# Patient Record
Sex: MALE | Race: WHITE | Employment: OTHER | URBAN - METROPOLITAN AREA
[De-identification: names, ages, dates, MRNs, and addresses within clinical notes are randomized per-mention and may not be internally consistent; named-entity substitution may affect disease eponyms.]

---

## 2018-04-10 ENCOUNTER — OFFICE VISIT (OUTPATIENT)
Dept: CARDIOLOGY CLINIC | Age: 83
End: 2018-04-10
Payer: MEDICARE

## 2018-04-10 VITALS
HEART RATE: 58 BPM | WEIGHT: 160.8 LBS | BODY MASS INDEX: 28.49 KG/M2 | HEIGHT: 63 IN | DIASTOLIC BLOOD PRESSURE: 58 MMHG | SYSTOLIC BLOOD PRESSURE: 96 MMHG | RESPIRATION RATE: 16 BRPM

## 2018-04-10 DIAGNOSIS — I48.19 PERSISTENT ATRIAL FIBRILLATION (HCC): Primary | ICD-10-CM

## 2018-04-10 PROCEDURE — 93000 ELECTROCARDIOGRAM COMPLETE: CPT | Performed by: INTERNAL MEDICINE

## 2018-04-10 PROCEDURE — 99214 OFFICE O/P EST MOD 30 MIN: CPT | Performed by: INTERNAL MEDICINE

## 2018-04-10 RX ORDER — AMLODIPINE BESYLATE 10 MG/1
10 TABLET ORAL DAILY
Qty: 90 TABLET | Refills: 3 | Status: SHIPPED
Start: 2018-04-10 | End: 2021-05-04 | Stop reason: DRUGHIGH

## 2018-04-10 RX ORDER — LEVOTHYROXINE SODIUM 0.05 MG/1
50 TABLET ORAL DAILY
COMMUNITY

## 2018-06-15 ENCOUNTER — TELEPHONE (OUTPATIENT)
Dept: CARDIOLOGY CLINIC | Age: 83
End: 2018-06-15

## 2018-07-30 DIAGNOSIS — R00.2 PALPITATION: ICD-10-CM

## 2018-07-30 DIAGNOSIS — I44.7 LBBB (LEFT BUNDLE BRANCH BLOCK): ICD-10-CM

## 2018-07-30 DIAGNOSIS — R06.02 SOB (SHORTNESS OF BREATH): ICD-10-CM

## 2018-10-19 ENCOUNTER — TELEPHONE (OUTPATIENT)
Dept: CARDIOLOGY CLINIC | Age: 83
End: 2018-10-19

## 2018-10-26 ENCOUNTER — TELEPHONE (OUTPATIENT)
Dept: CARDIOLOGY CLINIC | Age: 83
End: 2018-10-26

## 2018-11-08 ENCOUNTER — HOSPITAL ENCOUNTER (OUTPATIENT)
Dept: CT IMAGING | Age: 83
Discharge: HOME | End: 2018-11-10
Payer: MEDICARE

## 2018-11-08 DIAGNOSIS — R05.2 SUBACUTE COUGH: ICD-10-CM

## 2018-11-08 PROCEDURE — 71250 CT THORAX DX C-: CPT

## 2019-01-14 ENCOUNTER — OFFICE VISIT (OUTPATIENT)
Dept: CARDIOLOGY CLINIC | Age: 84
End: 2019-01-14
Payer: MEDICARE

## 2019-01-14 VITALS
HEIGHT: 62 IN | BODY MASS INDEX: 29.59 KG/M2 | HEART RATE: 59 BPM | WEIGHT: 160.8 LBS | DIASTOLIC BLOOD PRESSURE: 70 MMHG | RESPIRATION RATE: 16 BRPM | SYSTOLIC BLOOD PRESSURE: 122 MMHG

## 2019-01-14 DIAGNOSIS — I48.19 PERSISTENT ATRIAL FIBRILLATION (HCC): Primary | ICD-10-CM

## 2019-01-14 PROCEDURE — 93000 ELECTROCARDIOGRAM COMPLETE: CPT | Performed by: INTERNAL MEDICINE

## 2019-01-14 PROCEDURE — 99214 OFFICE O/P EST MOD 30 MIN: CPT | Performed by: INTERNAL MEDICINE

## 2019-01-14 RX ORDER — FINASTERIDE 5 MG/1
5 TABLET, FILM COATED ORAL DAILY
COMMUNITY

## 2019-03-01 ENCOUNTER — TELEPHONE (OUTPATIENT)
Dept: CARDIOLOGY CLINIC | Age: 84
End: 2019-03-01

## 2019-05-24 ENCOUNTER — HOSPITAL ENCOUNTER (OUTPATIENT)
Age: 84
Discharge: HOME | End: 2019-05-26
Payer: MEDICARE

## 2019-05-24 LAB — PROSTATE SPECIFIC ANTIGEN: 0.16 NG/ML (ref 0–4)

## 2019-05-24 PROCEDURE — G0103 PSA SCREENING: HCPCS

## 2019-07-01 ENCOUNTER — TELEPHONE (OUTPATIENT)
Dept: CARDIOLOGY CLINIC | Age: 84
End: 2019-07-01

## 2019-08-13 ENCOUNTER — OFFICE VISIT (OUTPATIENT)
Dept: CARDIOLOGY CLINIC | Age: 84
End: 2019-08-13
Payer: MEDICARE

## 2019-08-13 VITALS
HEIGHT: 63 IN | HEART RATE: 70 BPM | WEIGHT: 158 LBS | DIASTOLIC BLOOD PRESSURE: 60 MMHG | BODY MASS INDEX: 28 KG/M2 | SYSTOLIC BLOOD PRESSURE: 118 MMHG | RESPIRATION RATE: 16 BRPM

## 2019-08-13 DIAGNOSIS — I48.19 PERSISTENT ATRIAL FIBRILLATION (HCC): Primary | ICD-10-CM

## 2019-08-13 PROCEDURE — 99214 OFFICE O/P EST MOD 30 MIN: CPT | Performed by: INTERNAL MEDICINE

## 2019-08-13 PROCEDURE — 93000 ELECTROCARDIOGRAM COMPLETE: CPT | Performed by: INTERNAL MEDICINE

## 2019-08-13 RX ORDER — GLUCOSAMINE HCL 500 MG
3000 TABLET ORAL DAILY
COMMUNITY

## 2019-08-13 RX ORDER — POTASSIUM CHLORIDE 20 MEQ/1
20 TABLET, EXTENDED RELEASE ORAL DAILY
COMMUNITY
Start: 2019-06-12

## 2019-08-13 NOTE — PROGRESS NOTES
Cheyanne             OFFICE VISIT   :  36    CHIEF COMPLAINT: CAD/Afib    HPI: Patient is a 81 y.o. male with a past medical history significant for CAD, status post acute myocardial infarction on 12/3/10 at which time patient went to the lab emergently after transferring from [facility] to [facility], which had a catheterization that showed total occlusion to the mid LAD with high grade stenosis in his proximal LAD. Patient received two bare metal stents. The other remaining vessels appeared to have no significant stenoses and at that juncture, patient appeared to be fully revascularized. Patient also has a history of hypertension and hyperlipidemia. Patient presents in follow up.   he was noted to be in afib with an new LBBB. Stress was stable. Underwent GINETTE-DCCV to sinus. In afib. No CP or SOB. Patient denies any symptoms that suggest heart failure. Past Medical History:   Diagnosis Date    A-fib [facility])     Arthritis     CAD (coronary artery disease)     HTN (hypertension)     Hyperlipemia     LBBB (left bundle branch block)     MI (myocardial infarction) (HCC)        No Known Allergies    Current Outpatient Medications   Medication Sig Dispense Refill    Cholecalciferol (VITAMIN D3) 1000 units CAPS Take 3,000 Units by mouth daily      rivaroxaban (XARELTO) 20 MG TABS tablet Take 1 tablet by mouth daily (with breakfast) 90 tablet 3    finasteride (PROSCAR) 5 MG tablet Take 5 mg by mouth daily      levothyroxine (SYNTHROID) 50 MCG tablet Take 50 mcg by mouth Daily      amLODIPine (NORVASC) 10 MG tablet Take 1 tablet by mouth daily 90 tablet 3    famotidine (PEPCID) 20 MG tablet Take 20 mg by mouth 2 times daily       doxazosin (CARDURA) 8 MG tablet Take 8 mg by mouth nightly.  nitroGLYCERIN (NITROSTAT) 0.4 MG SL tablet Place 0.4 mg under the tongue as needed.         metoprolol (LOPRESSOR) 100 MG tablet Take 100 mg by mouth 2 times daily

## 2019-09-20 ENCOUNTER — TELEPHONE (OUTPATIENT)
Dept: CARDIOLOGY CLINIC | Age: 84
End: 2019-09-20

## 2019-09-20 NOTE — TELEPHONE ENCOUNTER
I called and left a message on his voicemail letting him know we are out of samples but will call when we get some in.

## 2019-09-20 NOTE — TELEPHONE ENCOUNTER
Patient would like samples of  Xarelto 20 mg. Please call him at [phone] if we have any.  Thank you.

## 2020-03-29 NOTE — TELEPHONE ENCOUNTER
I called and spoke to Halie Lomas wife and let her know his samples of Xarelto are available to be picked up.
Patient would like samples of  Xarelto 20 mg. Please call him at [phone] if we have any. Thank you.
S/P cholecystectomy

## 2020-04-17 RX ORDER — RIVAROXABAN 20 MG/1
TABLET, FILM COATED ORAL
Qty: 90 TABLET | Refills: 3 | Status: SHIPPED
Start: 2020-04-17 | End: 2020-10-26 | Stop reason: SDUPTHER

## 2020-05-29 ENCOUNTER — HOSPITAL ENCOUNTER (OUTPATIENT)
Age: 85
Discharge: HOME | End: 2020-05-31
Payer: MEDICARE

## 2020-05-29 LAB — PROSTATE SPECIFIC ANTIGEN: 0.15 NG/ML (ref 0–4)

## 2020-05-29 PROCEDURE — G0103 PSA SCREENING: HCPCS

## 2020-08-03 ENCOUNTER — OFFICE VISIT (OUTPATIENT)
Dept: CARDIOLOGY CLINIC | Age: 85
End: 2020-08-03
Payer: MEDICARE

## 2020-08-03 VITALS
SYSTOLIC BLOOD PRESSURE: 124 MMHG | BODY MASS INDEX: 28 KG/M2 | HEIGHT: 63 IN | WEIGHT: 158 LBS | DIASTOLIC BLOOD PRESSURE: 70 MMHG | HEART RATE: 71 BPM | TEMPERATURE: 96.7 F | RESPIRATION RATE: 16 BRPM

## 2020-08-03 PROCEDURE — 99214 OFFICE O/P EST MOD 30 MIN: CPT | Performed by: INTERNAL MEDICINE

## 2020-08-03 PROCEDURE — 93000 ELECTROCARDIOGRAM COMPLETE: CPT | Performed by: INTERNAL MEDICINE

## 2020-08-03 NOTE — PROGRESS NOTES
Yani Free            OFFICE VISIT   :  36    CHIEF COMPLAINT: CAD/Afib    HPI: Patient is a 81 y.o. male with a past medical history significant for CAD, status post acute myocardial infarction on 12/3/10 at which time patient went to the lab emergently after transferring from [facility] to [facility], which had a catheterization that showed total occlusion to the mid LAD with high grade stenosis in his proximal LAD. Patient received two bare metal stents. The other remaining vessels appeared to have no significant stenoses and at that juncture, patient appeared to be fully revascularized. Patient also has a history of hypertension and hyperlipidemia. In afib. No CP or SOB. Patient denies any symptoms that suggest heart failure. Past Medical History:   Diagnosis Date    A-fib ([address]     Arthritis     CAD (coronary artery disease)     HTN (hypertension)     Hyperlipemia     LBBB (left bundle branch block)     MI (myocardial infarction) (HCC)        No Known Allergies    Current Outpatient Medications   Medication Sig Dispense Refill    XARELTO 20 MG TABS tablet TAKE 1 TABLET DAILY WITH   BREAKFAST 90 tablet 3    potassium chloride (KLOR-CON M) 20 MEQ extended release tablet       Cholecalciferol (VITAMIN D3) 1000 units CAPS Take 3,000 Units by mouth daily      finasteride (PROSCAR) 5 MG tablet Take 5 mg by mouth daily      levothyroxine (SYNTHROID) 50 MCG tablet Take 50 mcg by mouth Daily      amLODIPine (NORVASC) 10 MG tablet Take 1 tablet by mouth daily 90 tablet 3    famotidine (PEPCID) 20 MG tablet Take 20 mg by mouth 2 times daily       doxazosin (CARDURA) 8 MG tablet Take 8 mg by mouth nightly.  nitroGLYCERIN (NITROSTAT) 0.4 MG SL tablet Place 0.4 mg under the tongue as needed.  metoprolol (LOPRESSOR) 100 MG tablet Take 100 mg by mouth 2 times daily       Losartan Potassium-HCTZ (HYZAAR PO) Take  by mouth daily.  100-25 mg       No current facility-administered medications for this visit. Social History     Socioeconomic History    Marital status:      Spouse name: Not on file    Number of children: Not on file    Years of education: Not on file    Highest education level: Not on file   Occupational History    Not on file   Social Needs    Financial resource strain: Not on file    Food insecurity     Worry: Not on file     Inability: Not on file    Transportation needs     Medical: Not on file     Non-medical: Not on file   Tobacco Use    Smoking status: Former Smoker     Packs/day: 0.30     Years: 20.00     Pack years: 6.00     Types: Cigarettes     Last attempt to quit: 1997     Years since quittin.6    Smokeless tobacco: Never Used   Substance and Sexual Activity    Alcohol use: Yes     Comment: occassionally    Drug use: No    Sexual activity: Not on file   Lifestyle    Physical activity     Days per week: Not on file     Minutes per session: Not on file    Stress: Not on file   Relationships    Social connections     Talks on phone: Not on file     Gets together: Not on file     Attends Jainism service: Not on file     Active member of club or organization: Not on file     Attends meetings of clubs or organizations: Not on file     Relationship status: Not on file    Intimate partner violence     Fear of current or ex partner: Not on file     Emotionally abused: Not on file     Physically abused: Not on file     Forced sexual activity: Not on file   Other Topics Concern    Not on file   Social History Narrative    Not on file       Family History   Problem Relation Age of Onset    High Blood Pressure Father     High Blood Pressure Sister     Heart Disease Brother     Cancer Brother     Other Brother         accident       Review of Systems:  Heart: as above   Lungs: as above   Eyes: denies changes in vision or discharge. Ears: denies changes in hearing or pain.    Nose: denies epistaxis or masses   Throat: denies sore throat or trouble swallowing. Neuro: denies numbness, tingling, tremors. Skin: denies rashes or itching. : denies hematuria, dysuria   GI: denies vomiting, diarrhea   Psych: denies mood changed, anxiety, depression. all others negative. Physical Exam   /70   Pulse 71   Temp 96.7 °F (35.9 °C)   Resp 16   Ht 5' 3\" (1.6 m)   Wt 158 lb (71.7 kg)   BMI 27.99 kg/m²   Constitutional: Oriented to person, place, and time. Well-developed and well-nourished. No distress. Head: Normocephalic and atraumatic. Eyes: EOM are normal. Pupils are equal, round, and reactive to light. Neck: Normal range of motion. Neck supple. No hepatojugular reflux and no JVD present. Carotid bruit is not present. No tracheal deviation present. No thyromegaly present. Cardiovascular: Normal rate, irregular rhythm, normal heart sounds and intact distal pulses. Exam reveals no gallop and no friction rub. No murmur heard. Pulmonary/Chest: Effort normal and breath sounds normal. No respiratory distress. No wheezes. No rales. No tenderness. Abdominal: Soft. Bowel sounds are normal. No distension and no mass. No tenderness. No rebound and no guarding. Musculoskeletal: Normal range of motion. No edema and no tenderness. Lymphadenopathy:   No cervical adenopathy. Neurological: Alert and oriented to person, place, and time. Skin: Skin is warm and dry. No rash noted. Not diaphoretic. No erythema. Psychiatric: Normal mood and affect. Behavior is normal.     EKG: Afib. LBBB. ASSESSMENT AND PLAN:  1. CAD:   Stress stable 1/16. Continue current medications and aggressive secondary prevention. 2. Chronic Afib: In afib. Asymptomatic. Continue metoprolol. Xarelto. EP input noted. D/w patient. Patient rate controlled and on Xarelto. Patient states he is asymptomatic. Will pursue rate control and anticoagulation strategy. 3. HTN: Stable.     4. Lipids: Continue Zocor. 5. GERD: Per PCP. Shahab Beard D.O.   Cardiologist  Cardiology, [facility]

## 2021-04-07 ENCOUNTER — TELEPHONE (OUTPATIENT)
Dept: ADMINISTRATIVE | Age: 86
End: 2021-04-07

## 2021-04-07 NOTE — TELEPHONE ENCOUNTER
Pt's wife calling to schedule an OV with [facility] c/o of burping for the past month. She states this is what he did prior to his past heart attack. Next available OV with [facility] is the week of 5/17/21. Advise please. Wife states you can leave a message.

## 2021-04-07 NOTE — TELEPHONE ENCOUNTER
I spoke with patient's wife, she states that patient saw his pcp yesterday and was instructed to f/u with cardiology regarding indigestion and burping, she said patient does not have chest pain or SOB. I scheduled the patient for a f/u next week and informed the wife to take the patient to the ER if his symptoms  get worse, she understood instructions.

## 2021-04-13 ENCOUNTER — OFFICE VISIT (OUTPATIENT)
Dept: CARDIOLOGY CLINIC | Age: 86
End: 2021-04-13
Payer: MEDICARE

## 2021-04-13 VITALS
DIASTOLIC BLOOD PRESSURE: 64 MMHG | RESPIRATION RATE: 16 BRPM | SYSTOLIC BLOOD PRESSURE: 118 MMHG | HEART RATE: 66 BPM | HEIGHT: 61 IN | WEIGHT: 158.6 LBS | BODY MASS INDEX: 29.94 KG/M2

## 2021-04-13 DIAGNOSIS — R07.2 PRECORDIAL PAIN: ICD-10-CM

## 2021-04-13 DIAGNOSIS — I48.19 PERSISTENT ATRIAL FIBRILLATION (HCC): Primary | ICD-10-CM

## 2021-04-13 PROCEDURE — 99214 OFFICE O/P EST MOD 30 MIN: CPT | Performed by: INTERNAL MEDICINE

## 2021-04-13 PROCEDURE — 93000 ELECTROCARDIOGRAM COMPLETE: CPT | Performed by: INTERNAL MEDICINE

## 2021-04-13 RX ORDER — ATORVASTATIN CALCIUM 20 MG/1
20 TABLET, FILM COATED ORAL DAILY
Qty: 90 TABLET | Refills: 3 | Status: SHIPPED
Start: 2021-04-13 | End: 2021-04-14 | Stop reason: SDUPTHER

## 2021-04-13 RX ORDER — ATORVASTATIN CALCIUM 40 MG/1
40 TABLET, FILM COATED ORAL DAILY
Qty: 90 TABLET | Refills: 3 | Status: SHIPPED
Start: 2021-04-13 | End: 2021-04-13

## 2021-04-13 RX ORDER — NITROGLYCERIN 0.4 MG/1
0.4 TABLET SUBLINGUAL PRN
Qty: 25 TABLET | Refills: 1 | Status: SHIPPED | OUTPATIENT
Start: 2021-04-13

## 2021-04-13 NOTE — PROGRESS NOTES
Christiano Monte            OFFICE VISIT   :  36    CHIEF COMPLAINT: CAD/Afib    HPI: Patient is a 81 y.o. male with a past medical history significant for CAD, status post acute myocardial infarction on 12/3/10 at which time patient went to the lab emergently after transferring from [facility] to [facility], which had a catheterization that showed total occlusion to the mid LAD with high grade stenosis in his proximal LAD. Patient received two bare metal stents. The other remaining vessels appeared to have no significant stenoses and at that juncture, patient appeared to be fully revascularized. Patient also has a history of hypertension and hyperlipidemia. In afib. No CP or SOB. Patient denies any symptoms that suggest heart failure. Past Medical History:   Diagnosis Date    A-fib ([address]     Arthritis     CAD (coronary artery disease)     HTN (hypertension)     Hyperlipemia     LBBB (left bundle branch block)     MI (myocardial infarction) (HCC)        No Known Allergies    Current Outpatient Medications   Medication Sig Dispense Refill    rivaroxaban (XARELTO) 20 MG TABS tablet TAKE 1 TABLET DAILY WITH   BREAKFAST 30 tablet 0    potassium chloride (KLOR-CON M) 20 MEQ extended release tablet       Cholecalciferol (VITAMIN D3) 1000 units CAPS Take 3,000 Units by mouth daily      finasteride (PROSCAR) 5 MG tablet Take 5 mg by mouth daily      levothyroxine (SYNTHROID) 50 MCG tablet Take 50 mcg by mouth Daily      amLODIPine (NORVASC) 10 MG tablet Take 1 tablet by mouth daily 90 tablet 3    famotidine (PEPCID) 20 MG tablet Take 20 mg by mouth 2 times daily       doxazosin (CARDURA) 8 MG tablet Take 8 mg by mouth nightly.  nitroGLYCERIN (NITROSTAT) 0.4 MG SL tablet Place 0.4 mg under the tongue as needed.  metoprolol (LOPRESSOR) 100 MG tablet Take 100 mg by mouth 2 times daily       Losartan Potassium-HCTZ (HYZAAR PO) Take  by mouth daily.  100-25 mg       No current facility-administered medications for this visit. Social History     Socioeconomic History    Marital status:      Spouse name: Not on file    Number of children: Not on file    Years of education: Not on file    Highest education level: Not on file   Occupational History    Not on file   Social Needs    Financial resource strain: Not on file    Food insecurity     Worry: Not on file     Inability: Not on file    Transportation needs     Medical: Not on file     Non-medical: Not on file   Tobacco Use    Smoking status: Former Smoker     Packs/day: 0.30     Years: 20.00     Pack years: 6.00     Types: Cigarettes     Quit date: 1997     Years since quittin.2    Smokeless tobacco: Never Used   Substance and Sexual Activity    Alcohol use: Yes     Comment: occassionally    Drug use: No    Sexual activity: Not on file   Lifestyle    Physical activity     Days per week: Not on file     Minutes per session: Not on file    Stress: Not on file   Relationships    Social connections     Talks on phone: Not on file     Gets together: Not on file     Attends Baptism service: Not on file     Active member of club or organization: Not on file     Attends meetings of clubs or organizations: Not on file     Relationship status: Not on file    Intimate partner violence     Fear of current or ex partner: Not on file     Emotionally abused: Not on file     Physically abused: Not on file     Forced sexual activity: Not on file   Other Topics Concern    Not on file   Social History Narrative    Not on file       Family History   Problem Relation Age of Onset    High Blood Pressure Father     High Blood Pressure Sister     Heart Disease Brother     Cancer Brother     Other Brother         accident       Review of Systems:  Heart: as above   Lungs: as above   Eyes: denies changes in vision or discharge. Ears: denies changes in hearing or pain.    Nose: denies epistaxis or masses   Throat: denies sore throat or trouble swallowing. Neuro: denies numbness, tingling, tremors. Skin: denies rashes or itching. : denies hematuria, dysuria   GI: denies vomiting, diarrhea   Psych: denies mood changed, anxiety, depression. all others negative. Physical Exam   There were no vitals taken for this visit. Constitutional: Oriented to person, place, and time. Well-developed and well-nourished. No distress. Head: Normocephalic and atraumatic. Eyes: EOM are normal. Pupils are equal, round, and reactive to light. Neck: Normal range of motion. Neck supple. No hepatojugular reflux and no JVD present. Carotid bruit is not present. No tracheal deviation present. No thyromegaly present. Cardiovascular: Normal rate, irregular rhythm, normal heart sounds and intact distal pulses. Exam reveals no gallop and no friction rub. No murmur heard. Pulmonary/Chest: Effort normal and breath sounds normal. No respiratory distress. No wheezes. No rales. No tenderness. Abdominal: Soft. Bowel sounds are normal. No distension and no mass. No tenderness. No rebound and no guarding. Musculoskeletal: Normal range of motion. No edema and no tenderness. Lymphadenopathy:   No cervical adenopathy. Neurological: Alert and oriented to person, place, and time. Skin: Skin is warm and dry. No rash noted. Not diaphoretic. No erythema. Psychiatric: Normal mood and affect. Behavior is normal.     EKG: Afib. LBBB. ASSESSMENT AND PLAN:  1. CAD/Chest pain:     Stress given new symptoms. Continue statin/BB. No ASA due to Xarelto. 2. Chronic Afib: In afib. Asymptomatic. Continue metoprolol. Xarelto. 3. HTN: Stable. 4. Lipids: Statin. 5. GERD: Per PCP. Frankey Lard, D.O.   Cardiologist  Cardiology, [address] [place] [address]

## 2021-04-14 RX ORDER — ATORVASTATIN CALCIUM 20 MG/1
20 TABLET, FILM COATED ORAL DAILY
Qty: 90 TABLET | Refills: 3 | Status: SHIPPED
Start: 2021-04-14 | End: 2022-06-03

## 2021-04-23 ENCOUNTER — TELEPHONE (OUTPATIENT)
Dept: CARDIOLOGY | Age: 86
End: 2021-04-23

## 2021-04-23 NOTE — TELEPHONE ENCOUNTER
SCHEDULED stress test  FOR 05-04-21 REVIEWED COVID CHECKLIST WITH PATIENT.     Electronically signed by Samia Turner on 4/23/2021 at 1:08 PM

## 2021-04-29 ENCOUNTER — TELEPHONE (OUTPATIENT)
Dept: CARDIOLOGY | Age: 86
End: 2021-04-29

## 2021-05-03 ENCOUNTER — TELEPHONE (OUTPATIENT)
Dept: CARDIOLOGY | Age: 86
End: 2021-05-03

## 2021-05-03 NOTE — TELEPHONE ENCOUNTER
5-3-21 @ 1420. Called and spoke with the patient's wife with a reminder regarding the patient's stress test on 5-4-21 @ 0930. Instructions given including to hold his Metoprolol/Lopressor for 24 hours before his stress test but bring it with him. Reviewed Covid checklist. She voiced understanding to all instructions.   Juan Solis RN

## 2021-05-04 ENCOUNTER — HOSPITAL ENCOUNTER (OUTPATIENT)
Dept: CARDIOLOGY | Age: 86
Discharge: HOME | End: 2021-05-04
Payer: MEDICARE

## 2021-05-04 VITALS
RESPIRATION RATE: 20 BRPM | DIASTOLIC BLOOD PRESSURE: 70 MMHG | WEIGHT: 158 LBS | BODY MASS INDEX: 29.08 KG/M2 | HEIGHT: 62 IN | SYSTOLIC BLOOD PRESSURE: 126 MMHG | HEART RATE: 88 BPM

## 2021-05-04 DIAGNOSIS — R07.2 PRECORDIAL PAIN: ICD-10-CM

## 2021-05-04 PROCEDURE — 3430000000 HC RX DIAGNOSTIC RADIOPHARMACEUTICAL: Performed by: INTERNAL MEDICINE

## 2021-05-04 PROCEDURE — 78452 HT MUSCLE IMAGE SPECT MULT: CPT

## 2021-05-04 PROCEDURE — 6360000002 HC RX W HCPCS: Performed by: INTERNAL MEDICINE

## 2021-05-04 PROCEDURE — 93017 CV STRESS TEST TRACING ONLY: CPT

## 2021-05-04 PROCEDURE — 2580000003 HC RX 258: Performed by: INTERNAL MEDICINE

## 2021-05-04 PROCEDURE — A9500 TC99M SESTAMIBI: HCPCS | Performed by: INTERNAL MEDICINE

## 2021-05-04 RX ORDER — SODIUM CHLORIDE 0.9 % (FLUSH) 0.9 %
10 SYRINGE (ML) INJECTION PRN
Status: DISCONTINUED | OUTPATIENT
Start: 2021-05-04 | End: 2021-05-05 | Stop reason: HOSPADM

## 2021-05-04 RX ORDER — AMLODIPINE BESYLATE 5 MG/1
TABLET ORAL DAILY
COMMUNITY
Start: 2021-02-02

## 2021-05-04 RX ADMIN — SODIUM CHLORIDE, PRESERVATIVE FREE 10 ML: 5 INJECTION INTRAVENOUS at 12:11

## 2021-05-04 RX ADMIN — SODIUM CHLORIDE, PRESERVATIVE FREE 10 ML: 5 INJECTION INTRAVENOUS at 12:10

## 2021-05-04 RX ADMIN — SODIUM CHLORIDE, PRESERVATIVE FREE 10 ML: 5 INJECTION INTRAVENOUS at 09:41

## 2021-05-04 RX ADMIN — REGADENOSON 0.4 MG: 0.08 INJECTION, SOLUTION INTRAVENOUS at 12:10

## 2021-05-04 RX ADMIN — Medication 10.3 MILLICURIE: at 09:40

## 2021-05-04 RX ADMIN — Medication 29 MILLICURIE: at 12:10

## 2021-05-04 NOTE — PROCEDURES
[address] [facility] and Vascular Lab - [address], [address] [facility]  [phone]                Pharmacologic Stress Nuclear Gated SPECT Study    Name: [facility] Account Number: [de-identified]    :  1935          Sex: male         Date of Study:  2021    Height: 5' 2\" (157.5 cm)         Weight: 158 lb (71.7 kg)     Ordering Provider: Baldemar Clinton DO          PCP: Nilesh Dove MD      Cardiologist: Baldemar Clinton DO      Interpreting Physician: Nirmal Tejeda MD  _________________________________________________________________________________    Indication:   Evaluation of extent and severity of coronary artery disease    Clinical History:   Patient has prior history of coronary artery disease. Resting ECG:    Atrial fibrillation 82 bpm. LBBB QRSd 140 ms. Right axis. Procedure:   Pharmacologic stress testing was performed with regadenoson 0.4 mg for 15 seconds. The heart rate was 82 at baseline and dino to 134 beats during the infusion. This corresponds to 99% of maximum predicted heart rate. The blood pressure at baseline was 126/70 and blood pressure at the end of infusion was 130/70. Blood pressure response was normal during the stress procedure. The patient experienced warm feeling during the infusion. ECG during the infusion did not change. IMAGING: Myocardial perfusion imaging was performed at rest 30-35 minutes following the intravenous injection of 10.3 mCi of (Tc-Sestamibi) followed by 10 ml of Normal Saline. As per infusion protocol, the patient was injected intravenously with 29.0 mCi of (Tc-Sestamibi) followed by 10 ml of Normal Saline. Gated post-stress tomographic imaging was performed 20-25 minutes after stress. FINDINGS: The overall quality of the study was good.      Left ventricular cavity size was noted to be normal.    Rotational analog analysis demonstrated soft tissue diaphragmatic

## 2021-05-05 ENCOUNTER — TELEPHONE (OUTPATIENT)
Dept: CARDIOLOGY CLINIC | Age: 86
End: 2021-05-05

## 2021-09-03 ENCOUNTER — OFFICE VISIT (OUTPATIENT)
Dept: CARDIOLOGY CLINIC | Age: 86
End: 2021-09-03
Payer: MEDICARE

## 2021-09-03 VITALS
WEIGHT: 156.5 LBS | DIASTOLIC BLOOD PRESSURE: 78 MMHG | HEIGHT: 62 IN | BODY MASS INDEX: 28.8 KG/M2 | HEART RATE: 72 BPM | SYSTOLIC BLOOD PRESSURE: 128 MMHG | RESPIRATION RATE: 16 BRPM

## 2021-09-03 DIAGNOSIS — I44.7 LBBB (LEFT BUNDLE BRANCH BLOCK): ICD-10-CM

## 2021-09-03 DIAGNOSIS — I48.19 PERSISTENT ATRIAL FIBRILLATION (HCC): Primary | ICD-10-CM

## 2021-09-03 DIAGNOSIS — I25.10 CORONARY ARTERY DISEASE INVOLVING NATIVE CORONARY ARTERY OF NATIVE HEART WITHOUT ANGINA PECTORIS: ICD-10-CM

## 2021-09-03 DIAGNOSIS — I10 ESSENTIAL HYPERTENSION: ICD-10-CM

## 2021-09-03 PROCEDURE — 93000 ELECTROCARDIOGRAM COMPLETE: CPT | Performed by: INTERNAL MEDICINE

## 2021-09-03 PROCEDURE — 99214 OFFICE O/P EST MOD 30 MIN: CPT | Performed by: INTERNAL MEDICINE

## 2021-09-03 RX ORDER — ATORVASTATIN CALCIUM 20 MG/1
20 TABLET, FILM COATED ORAL DAILY
COMMUNITY

## 2021-09-03 NOTE — PROGRESS NOTES
Jasmine Jamesjerome            OFFICE VISIT   :  36    CHIEF COMPLAINT: CAD/Afib    HPI: Patient is a 81 y.o. male with a past medical history significant for CAD, status post acute myocardial infarction on 12/3/10 at which time patient went to the lab emergently after transferring from [facility] to [facility], which had a catheterization that showed total occlusion to the mid LAD with high grade stenosis in his proximal LAD. Patient received two bare metal stents. The other remaining vessels appeared to have no significant stenoses and at that juncture, patient appeared to be fully revascularized. Patient also has a history of hypertension and hyperlipidemia. In afib. No CP or SOB. Patient denies any symptoms that suggest heart failure.        Past Medical History:   Diagnosis Date    A-fib ([facility] [address]     Arthritis     CAD (coronary artery disease)     HTN (hypertension)     Hyperlipemia     LBBB (left bundle branch block)     MI (myocardial infarction) (HCC)        No Known Allergies    Current Outpatient Medications   Medication Sig Dispense Refill    atorvastatin (LIPITOR) 10 MG tablet Take 10 mg by mouth daily      rivaroxaban (XARELTO) 20 MG TABS tablet Take 1 tablet by mouth Daily with supper for 1 day 14 tablet 0    amLODIPine (NORVASC) 5 MG tablet daily      atorvastatin (LIPITOR) 20 MG tablet Take 1 tablet by mouth daily (Patient taking differently: Take 20 mg by mouth daily Indications: taking 1/2 pill daily 10 mg ) 90 tablet 3    nitroGLYCERIN (NITROSTAT) 0.4 MG SL tablet Place 1 tablet under the tongue as needed for Chest pain 25 tablet 1    rivaroxaban (XARELTO) 20 MG TABS tablet TAKE 1 TABLET DAILY WITH   BREAKFAST 30 tablet 0    potassium chloride (KLOR-CON M) 20 MEQ extended release tablet 20 mEq daily       Cholecalciferol (VITAMIN D3) 75 MCG (3000 UT) TABS Take 3,000 Units by mouth daily       finasteride (PROSCAR) 5 MG tablet Take 5 mg by mouth daily  levothyroxine (SYNTHROID) 50 MCG tablet Take 50 mcg by mouth Daily      famotidine (PEPCID) 20 MG tablet Take 20 mg by mouth 2 times daily       doxazosin (CARDURA) 8 MG tablet Take 8 mg by mouth nightly.  metoprolol (LOPRESSOR) 100 MG tablet Take 100 mg by mouth 2 times daily       Losartan Potassium-HCTZ (HYZAAR PO) Take  by mouth daily. 100-25 mg       No current facility-administered medications for this visit. Social History     Socioeconomic History    Marital status:      Spouse name: Not on file    Number of children: Not on file    Years of education: Not on file    Highest education level: Not on file   Occupational History    Not on file   Tobacco Use    Smoking status: Former Smoker     Packs/day: 0.30     Years: 20.00     Pack years: 6.00     Types: Cigarettes     Quit date: 1997     Years since quittin.6    Smokeless tobacco: Never Used   Vaping Use    Vaping Use: Never used   Substance and Sexual Activity    Alcohol use: Yes     Comment: occassionally    Drug use: No    Sexual activity: Not on file   Other Topics Concern    Not on file   Social History Narrative    Not on file     Social Determinants of Health     Financial Resource Strain:     Difficulty of Paying Living Expenses:    Food Insecurity:     Worried About [address] in the Last Year:     [facility] in the Last Year:    Transportation Needs:     Lack of Transportation (Medical):      Lack of Transportation (Non-Medical):    Physical Activity:     Days of Exercise per Week:     Minutes of Exercise per Session:    Stress:     Feeling of Stress :    Social Connections:     Frequency of Communication with Friends and Family:     Frequency of Social Gatherings with Friends and Family:     Attends Yazdanism Services:     Active Member of Clubs or Organizations:     Attends Club or Organization Meetings:     Marital Status:    Intimate Partner Violence:     Fear of Current or Ex-Partner:     Emotionally Abused:     Physically Abused:     Sexually Abused:        Family History   Problem Relation Age of Onset    High Blood Pressure Father     High Blood Pressure Sister     Heart Disease Brother     Cancer Brother     Other Brother         accident       Review of Systems:  Heart: as above   Lungs: as above   Eyes: denies changes in vision or discharge. Ears: denies changes in hearing or pain. Nose: denies epistaxis or masses   Throat: denies sore throat or trouble swallowing. Neuro: denies numbness, tingling, tremors. Skin: denies rashes or itching. : denies hematuria, dysuria   GI: denies vomiting, diarrhea   Psych: denies mood changed, anxiety, depression. all others negative. Physical Exam   /78   Pulse 72   Resp 16   Ht 5' 2\" (1.575 m)   Wt 156 lb 8 oz (71 kg)   BMI 28.62 kg/m²   Constitutional: Oriented to person, place, and time. Well-developed and well-nourished. No distress. Head: Normocephalic and atraumatic. Eyes: EOM are normal. Pupils are equal, round, and reactive to light. Neck: Normal range of motion. Neck supple. No hepatojugular reflux and no JVD present. Carotid bruit is not present. No tracheal deviation present. No thyromegaly present. Cardiovascular: Normal rate, irregular rhythm, normal heart sounds and intact distal pulses. Exam reveals no gallop and no friction rub. No murmur heard. Pulmonary/Chest: Effort normal and breath sounds normal. No respiratory distress. No wheezes. No rales. No tenderness. Abdominal: Soft. Bowel sounds are normal. No distension and no mass. No tenderness. No rebound and no guarding. Musculoskeletal: Normal range of motion. No edema and no tenderness. Lymphadenopathy:   No cervical adenopathy. Neurological: Alert and oriented to person, place, and time. Skin: Skin is warm and dry. No rash noted. Not diaphoretic. No erythema. Psychiatric: Normal mood and affect.  Behavior is normal.     EKG: Afib. LBBB. Stress Impression 5/4/2021:     1. ECG during the infusion did not change, with baseline atrial fibrillation. 2. The myocardial perfusion imaging was abnormal.     The abnormality was a a large sized fixed defect in the septal and apical wall suggestive of a prior MI.   3. Overall left ventricular systolic function was normal with septal and apical hypokinesis. 4. Low risk post MI pharmacologic stress test.     ASSESSMENT AND PLAN:  1. CAD/Chest pain:     Stress low risk 5/4/2021. Continue statin/BB. No ASA due to Xarelto. 2. Chronic Afib: In afib. Asymptomatic. Continue metoprolol. Xarelto. 3. HTN: Stable. 4. Lipids: Statin. 5. GERD: Per PCP. Funmi Jefferson D.O.   Cardiologist  Cardiology, [facility]

## 2022-01-26 RX ORDER — RIVAROXABAN 20 MG/1
TABLET, FILM COATED ORAL
Qty: 90 TABLET | Refills: 3 | Status: SHIPPED | OUTPATIENT
Start: 2022-01-26

## 2022-04-26 RX ORDER — ATORVASTATIN CALCIUM 40 MG/1
TABLET, FILM COATED ORAL
Qty: 90 TABLET | Refills: 3 | OUTPATIENT
Start: 2022-04-26

## 2022-06-03 ENCOUNTER — OFFICE VISIT (OUTPATIENT)
Dept: CARDIOLOGY CLINIC | Age: 87
End: 2022-06-03
Payer: MEDICARE

## 2022-06-03 VITALS
RESPIRATION RATE: 12 BRPM | BODY MASS INDEX: 27.82 KG/M2 | WEIGHT: 157 LBS | SYSTOLIC BLOOD PRESSURE: 112 MMHG | HEIGHT: 63 IN | DIASTOLIC BLOOD PRESSURE: 70 MMHG | HEART RATE: 74 BPM

## 2022-06-03 DIAGNOSIS — I48.19 PERSISTENT ATRIAL FIBRILLATION (HCC): Primary | ICD-10-CM

## 2022-06-03 PROCEDURE — 99214 OFFICE O/P EST MOD 30 MIN: CPT | Performed by: INTERNAL MEDICINE

## 2022-06-03 PROCEDURE — 93000 ELECTROCARDIOGRAM COMPLETE: CPT | Performed by: INTERNAL MEDICINE

## 2022-06-03 PROCEDURE — 1123F ACP DISCUSS/DSCN MKR DOCD: CPT | Performed by: INTERNAL MEDICINE

## 2022-06-03 RX ORDER — METOPROLOL SUCCINATE 100 MG/1
100 TABLET, EXTENDED RELEASE ORAL DAILY
COMMUNITY
Start: 2022-04-27 | End: 2022-06-03

## 2022-06-03 RX ORDER — ATORVASTATIN CALCIUM 40 MG/1
TABLET, FILM COATED ORAL
COMMUNITY
Start: 2022-03-12 | End: 2022-06-03

## 2022-06-03 NOTE — PROGRESS NOTES
Cheyanne Jarrell            OFFICE VISIT   :  36    CHIEF COMPLAINT: CAD/Afib    HPI: Patient is a 81 y.o. male with a past medical history significant for CAD, status post acute myocardial infarction on 12/3/10 at which time patient went to the lab emergently after transferring from [facility] to [facility], which had a catheterization that showed total occlusion to the mid LAD with high grade stenosis in his proximal LAD. Patient received two bare metal stents. The other remaining vessels appeared to have no significant stenoses and at that juncture, patient appeared to be fully revascularized. Patient also has a history of hypertension and hyperlipidemia. In afib. No CP or SOB. Patient denies any symptoms that suggest heart failure. Past Medical History:   Diagnosis Date    [facility] ([facility] [address]     Arthritis     CAD (coronary artery disease)     HTN (hypertension)     Hyperlipemia     LBBB (left bundle branch block)     MI (myocardial infarction) (HCC)        No Known Allergies    Current Outpatient Medications   Medication Sig Dispense Refill    XARELTO 20 MG TABS tablet TAKE 1 TABLET DAILY WITH   BREAKFAST 90 tablet 3    atorvastatin (LIPITOR) 20 MG tablet Take 20 mg by mouth daily       amLODIPine (NORVASC) 5 MG tablet daily      nitroGLYCERIN (NITROSTAT) 0.4 MG SL tablet Place 1 tablet under the tongue as needed for Chest pain 25 tablet 1    potassium chloride (KLOR-CON M) 20 MEQ extended release tablet 20 mEq daily       Cholecalciferol (VITAMIN D3) 75 MCG (3000 UT) TABS Take 3,000 Units by mouth daily       finasteride (PROSCAR) 5 MG tablet Take 5 mg by mouth daily      levothyroxine (SYNTHROID) 50 MCG tablet Take 50 mcg by mouth Daily      famotidine (PEPCID) 20 MG tablet Take 20 mg by mouth 2 times daily       doxazosin (CARDURA) 8 MG tablet Take 8 mg by mouth nightly.  Losartan Potassium-HCTZ (HYZAAR PO) Take  by mouth daily.  100-25 mg       No Number of Places Lived in the Last Year: Not on file    Unstable Housing in the Last Year: Not on file       Family History   Problem Relation Age of Onset    High Blood Pressure Father     High Blood Pressure Sister     Heart Disease Brother     Cancer Brother     Other Brother         accident       Review of Systems:  Heart: as above   Lungs: as above   Eyes: denies changes in vision or discharge. Ears: denies changes in hearing or pain. Nose: denies epistaxis or masses   Throat: denies sore throat or trouble swallowing. Neuro: denies numbness, tingling, tremors. Skin: denies rashes or itching. : denies hematuria, dysuria   GI: denies vomiting, diarrhea   Psych: denies mood changed, anxiety, depression. all others negative. Physical Exam   /70   Pulse 74   Resp 12   Ht 5' 3\" (1.6 m)   Wt 157 lb (71.2 kg)   BMI 27.81 kg/m²   Constitutional: Oriented to person, place, and time. Well-developed and well-nourished. No distress. Head: Normocephalic and atraumatic. Eyes: EOM are normal. Pupils are equal, round, and reactive to light. Neck: Normal range of motion. Neck supple. No hepatojugular reflux and no JVD present. Carotid bruit is not present. No tracheal deviation present. No thyromegaly present. Cardiovascular: Normal rate, irregular rhythm, normal heart sounds and intact distal pulses. Exam reveals no gallop and no friction rub. No murmur heard. Pulmonary/Chest: Effort normal and breath sounds normal. No respiratory distress. No wheezes. No rales. No tenderness. Abdominal: Soft. Bowel sounds are normal. No distension and no mass. No tenderness. No rebound and no guarding. Musculoskeletal: Normal range of motion. No edema and no tenderness. Lymphadenopathy:   No cervical adenopathy. Neurological: Alert and oriented to person, place, and time. Skin: Skin is warm and dry. No rash noted. Not diaphoretic. No erythema. Psychiatric: Normal mood and affect. Behavior is normal.     EKG: Afib. LBBB. Stress Impression 5/4/2021:     1. ECG during the infusion did not change, with baseline atrial fibrillation. 2. The myocardial perfusion imaging was abnormal.     The abnormality was a a large sized fixed defect in the septal and apical wall suggestive of a prior MI.   3. Overall left ventricular systolic function was normal with septal and apical hypokinesis. 4. Low risk post MI pharmacologic stress test.     ASSESSMENT AND PLAN:  1. CAD/Chest pain:     Stress low risk 5/4/2021. Continue statin/BB. No ASA due to Xarelto. 2. Chronic Afib: In afib. Asymptomatic. Continue metoprolol. Xarelto. 3. HTN: Stable. 4. Lipids: Statin. 5. GERD: Per PCP. Donna Hendricks D.O.   Cardiologist  Cardiology, [facility]

## 2022-10-10 ENCOUNTER — HOSPITAL ENCOUNTER (OUTPATIENT)
Dept: INFUSION THERAPY | Age: 87
Setting detail: INFUSION SERIES
Discharge: HOME | End: 2022-10-10
Payer: MEDICARE

## 2022-10-10 VITALS
RESPIRATION RATE: 16 BRPM | HEART RATE: 92 BPM | OXYGEN SATURATION: 98 % | SYSTOLIC BLOOD PRESSURE: 135 MMHG | TEMPERATURE: 98.6 F | DIASTOLIC BLOOD PRESSURE: 67 MMHG

## 2022-10-10 PROCEDURE — 6360000002 HC RX W HCPCS: Performed by: NURSE PRACTITIONER

## 2022-10-10 PROCEDURE — M0222 HC BEBTELOVIMAB INJECTION: HCPCS

## 2022-10-10 PROCEDURE — 2580000003 HC RX 258: Performed by: NURSE PRACTITIONER

## 2022-10-10 RX ORDER — SODIUM CHLORIDE 0.9 % (FLUSH) 0.9 %
5-40 SYRINGE (ML) INJECTION PRN
Status: DISCONTINUED | OUTPATIENT
Start: 2022-10-10 | End: 2022-10-11 | Stop reason: HOSPADM

## 2022-10-10 RX ORDER — SODIUM CHLORIDE 9 MG/ML
100 INJECTION, SOLUTION INTRAVENOUS CONTINUOUS PRN
Status: DISCONTINUED | OUTPATIENT
Start: 2022-10-10 | End: 2022-10-11 | Stop reason: HOSPADM

## 2022-10-10 RX ORDER — BEBTELOVIMAB 87.5 MG/ML
175 INJECTION, SOLUTION INTRAVENOUS ONCE
Status: COMPLETED | OUTPATIENT
Start: 2022-10-10 | End: 2022-10-10

## 2022-10-10 RX ORDER — EPINEPHRINE 1 MG/ML
0.3 INJECTION, SOLUTION, CONCENTRATE INTRAVENOUS PRN
Status: DISCONTINUED | OUTPATIENT
Start: 2022-10-10 | End: 2022-10-11 | Stop reason: HOSPADM

## 2022-10-10 RX ORDER — SODIUM CHLORIDE 9 MG/ML
25 INJECTION, SOLUTION INTRAVENOUS PRN
Status: DISCONTINUED | OUTPATIENT
Start: 2022-10-10 | End: 2022-10-11 | Stop reason: HOSPADM

## 2022-10-10 RX ORDER — METHYLPREDNISOLONE SODIUM SUCCINATE 125 MG/2ML
125 INJECTION, POWDER, LYOPHILIZED, FOR SOLUTION INTRAMUSCULAR; INTRAVENOUS
Status: DISCONTINUED | OUTPATIENT
Start: 2022-10-10 | End: 2022-10-11 | Stop reason: HOSPADM

## 2022-10-10 RX ORDER — ACETAMINOPHEN 325 MG/1
650 TABLET ORAL
Status: DISCONTINUED | OUTPATIENT
Start: 2022-10-10 | End: 2022-10-11 | Stop reason: HOSPADM

## 2022-10-10 RX ORDER — ONDANSETRON 2 MG/ML
8 INJECTION INTRAMUSCULAR; INTRAVENOUS
Status: DISCONTINUED | OUTPATIENT
Start: 2022-10-10 | End: 2022-10-11 | Stop reason: HOSPADM

## 2022-10-10 RX ORDER — DIPHENHYDRAMINE HYDROCHLORIDE 50 MG/ML
50 INJECTION INTRAMUSCULAR; INTRAVENOUS
Status: DISCONTINUED | OUTPATIENT
Start: 2022-10-10 | End: 2022-10-11 | Stop reason: HOSPADM

## 2022-10-10 RX ORDER — ALBUTEROL SULFATE 90 UG/1
4 AEROSOL, METERED RESPIRATORY (INHALATION) PRN
Status: DISCONTINUED | OUTPATIENT
Start: 2022-10-10 | End: 2022-10-11 | Stop reason: HOSPADM

## 2022-10-10 RX ADMIN — SODIUM CHLORIDE, PRESERVATIVE FREE 10 ML: 5 INJECTION INTRAVENOUS at 10:09

## 2022-10-10 RX ADMIN — BEBTELOVIMAB 175 MG: 87.5 INJECTION, SOLUTION INTRAVENOUS at 10:15

## 2022-10-10 RX ADMIN — SODIUM CHLORIDE, PRESERVATIVE FREE 10 ML: 5 INJECTION INTRAVENOUS at 10:19

## 2022-12-02 ENCOUNTER — OFFICE VISIT (OUTPATIENT)
Dept: CARDIOLOGY CLINIC | Age: 87
End: 2022-12-02
Payer: MEDICARE

## 2022-12-02 VITALS
WEIGHT: 155.6 LBS | HEART RATE: 61 BPM | SYSTOLIC BLOOD PRESSURE: 140 MMHG | DIASTOLIC BLOOD PRESSURE: 82 MMHG | BODY MASS INDEX: 27.57 KG/M2 | HEIGHT: 63 IN

## 2022-12-02 DIAGNOSIS — I48.19 PERSISTENT ATRIAL FIBRILLATION (HCC): Primary | ICD-10-CM

## 2022-12-02 PROCEDURE — 1123F ACP DISCUSS/DSCN MKR DOCD: CPT | Performed by: INTERNAL MEDICINE

## 2022-12-02 PROCEDURE — 99214 OFFICE O/P EST MOD 30 MIN: CPT | Performed by: INTERNAL MEDICINE

## 2022-12-02 PROCEDURE — 93000 ELECTROCARDIOGRAM COMPLETE: CPT | Performed by: INTERNAL MEDICINE

## 2022-12-02 NOTE — PROGRESS NOTES
Mirna Tao            OFFICE VISIT   :  36    CHIEF COMPLAINT: CAD/Afib    HPI: Patient is a 81 y.o. male with a past medical history significant for CAD, status post acute myocardial infarction on 12/3/10 at which time patient went to the lab emergently after transferring from [facility] to [facility], which had a catheterization that showed total occlusion to the mid LAD with high grade stenosis in his proximal LAD. Patient received two bare metal stents. The other remaining vessels appeared to have no significant stenoses and at that juncture, patient appeared to be fully revascularized. Patient also has a history of hypertension and hyperlipidemia. In afib. No CP or SOB. Patient denies any symptoms that suggest heart failure. Past Medical History:   Diagnosis Date    A-fib [facility])     Arthritis     CAD (coronary artery disease)     HTN (hypertension)     Hyperlipemia     LBBB (left bundle branch block)     MI (myocardial infarction) (HCC)        No Known Allergies    Current Outpatient Medications   Medication Sig Dispense Refill    XARELTO 20 MG TABS tablet TAKE 1 TABLET DAILY WITH   BREAKFAST 90 tablet 3    atorvastatin (LIPITOR) 20 MG tablet Take 20 mg by mouth daily       amLODIPine (NORVASC) 5 MG tablet daily      nitroGLYCERIN (NITROSTAT) 0.4 MG SL tablet Place 1 tablet under the tongue as needed for Chest pain 25 tablet 1    potassium chloride (KLOR-CON M) 20 MEQ extended release tablet 20 mEq daily       Cholecalciferol (VITAMIN D3) 75 MCG (3000 UT) TABS Take 5,000 Units by mouth daily      levothyroxine (SYNTHROID) 50 MCG tablet Take 50 mcg by mouth Daily      famotidine (PEPCID) 20 MG tablet Take 20 mg by mouth 2 times daily       doxazosin (CARDURA) 8 MG tablet Take 8 mg by mouth nightly. Losartan Potassium-HCTZ (HYZAAR PO) Take  by mouth daily.  100-25 mg      finasteride (PROSCAR) 5 MG tablet Take 5 mg by mouth daily       No current facility-administered medications for this visit. Social History     Socioeconomic History    Marital status:      Spouse name: Not on file    Number of children: Not on file    Years of education: Not on file    Highest education level: Not on file   Occupational History    Not on file   Tobacco Use    Smoking status: Former     Packs/day: 0.30     Years: 20.00     Pack years: 6.00     Types: Cigarettes     Quit date: 1997     Years since quittin.9    Smokeless tobacco: Never   Vaping Use    Vaping Use: Never used   Substance and Sexual Activity    Alcohol use: Yes     Comment: occassionally    Drug use: No    Sexual activity: Not on file   Other Topics Concern    Not on file   Social History Narrative    Not on file     Social Determinants of Health     Financial Resource Strain: Not on file   Food Insecurity: Not on file   Transportation Needs: Not on file   Physical Activity: Not on file   Stress: Not on file   Social Connections: Not on file   Intimate Partner Violence: Not on file   Housing Stability: Not on file       Family History   Problem Relation Age of Onset    High Blood Pressure Father     High Blood Pressure Sister     Heart Disease Brother     Cancer Brother     Other Brother         accident       Review of Systems:  Heart: as above   Lungs: as above   Eyes: denies changes in vision or discharge. Ears: denies changes in hearing or pain. Nose: denies epistaxis or masses   Throat: denies sore throat or trouble swallowing. Neuro: denies numbness, tingling, tremors. Skin: denies rashes or itching. : denies hematuria, dysuria   GI: denies vomiting, diarrhea   Psych: denies mood changed, anxiety, depression. all others negative. Physical Exam   BP (!) 140/82   Pulse 61   Ht 5' 3\" (1.6 m)   Wt 155 lb 9.6 oz (70.6 kg)   BMI 27.56 kg/m²   Constitutional: Oriented to person, place, and time. Well-developed and well-nourished. No distress.     Head: Normocephalic and atraumatic. Eyes: EOM are normal. Pupils are equal, round, and reactive to light. Neck: Normal range of motion. Neck supple. No hepatojugular reflux and no JVD present. Carotid bruit is not present. No tracheal deviation present. No thyromegaly present. Cardiovascular: Normal rate, irregular rhythm, normal heart sounds and intact distal pulses. Exam reveals no gallop and no friction rub. No murmur heard. Pulmonary/Chest: Effort normal and breath sounds normal. No respiratory distress. No wheezes. No rales. No tenderness. Abdominal: Soft. Bowel sounds are normal. No distension and no mass. No tenderness. No rebound and no guarding. Musculoskeletal: Normal range of motion. No edema and no tenderness. Lymphadenopathy:   No cervical adenopathy. Neurological: Alert and oriented to person, place, and time. Skin: Skin is warm and dry. No rash noted. Not diaphoretic. No erythema. Psychiatric: Normal mood and affect. Behavior is normal.     EKG: Afib. LBBB. Stress Impression 5/4/2021:     1. ECG during the infusion did not change, with baseline atrial fibrillation. 2. The myocardial perfusion imaging was abnormal.     The abnormality was a a large sized fixed defect in the septal and apical wall suggestive of a prior MI.   3. Overall left ventricular systolic function was normal with septal and apical hypokinesis. 4. Low risk post MI pharmacologic stress test.     ASSESSMENT AND PLAN:  1. CAD/Chest pain:     Stress low risk 5/4/2021. Continue statin/BB. No ASA due to Xarelto. 2. Chronic Afib: In afib. Asymptomatic. Continue metoprolol. Xarelto. 3. HTN: Stable. 4. Lipids: Statin. 5. GERD: Per PCP. Mauro Garcia D.O.   Cardiologist  Cardiology, [facility]

## 2022-12-22 ENCOUNTER — TELEPHONE (OUTPATIENT)
Dept: CARDIOLOGY CLINIC | Age: 87
End: 2022-12-22

## 2022-12-22 NOTE — TELEPHONE ENCOUNTER
Xarelto 20mg samples were given to patient from [facility]  Exp date 10/24  Patient was given 2bottles

## 2023-12-27 ENCOUNTER — TELEPHONE (OUTPATIENT)
Dept: CARDIOLOGY CLINIC | Age: 88
End: 2023-12-27

## 2023-12-27 NOTE — TELEPHONE ENCOUNTER
Patient has been holding Norvasc since 12/18 as instructed, wife states patient is feeling better and does not have any edema in his feet, occasionally he gets headaches and light headedness, this is his bp for the past five days:    138/60  139/66  141/72  126/63  133/62

## 2023-12-27 NOTE — TELEPHONE ENCOUNTER
BP numbers reasonable. Continue same, let us know if symptoms do not improve in 1-2 weeks. Pauline Arthur D.O.   Cardiologist  Cardiology, [facility]

## 2024-01-09 ENCOUNTER — TELEPHONE (OUTPATIENT)
Dept: CARDIOLOGY CLINIC | Age: 89
End: 2024-01-09

## 2024-01-09 RX ORDER — NIFEDIPINE 60 MG/1
60 TABLET, EXTENDED RELEASE ORAL DAILY
Qty: 90 TABLET | Refills: 1 | Status: SHIPPED | OUTPATIENT
Start: 2024-01-09

## 2024-01-09 NOTE — TELEPHONE ENCOUNTER
Wife states that bp has been going up gradually, patient has been holding Norvasc since 12/18:    223/97  162/69  152/70  140/67  Wife was worried today so she gave patient an extra toprol, please advise

## 2024-01-12 ENCOUNTER — TELEPHONE (OUTPATIENT)
Dept: CARDIOLOGY CLINIC | Age: 89
End: 2024-01-12

## 2024-01-12 NOTE — TELEPHONE ENCOUNTER
Patient started Procardia 60mg daily on 1/9 as instructed, wife called today stating bp is much improved however patient is experiencing a lot of side effects, dizziness, headaches, body itch, constipated and unable to urinate, please advise

## 2024-01-12 NOTE — TELEPHONE ENCOUNTER
Try cutting it down to 30 mg daily.     Adela Ybarra D.O.  Cardiologist  Cardiology, [facility]

## 2024-01-15 ENCOUNTER — TELEPHONE (OUTPATIENT)
Dept: CARDIOLOGY CLINIC | Age: 89
End: 2024-01-15

## 2024-01-15 NOTE — TELEPHONE ENCOUNTER
Patient's wife called with an update, patient was instructed to cut procardia to 30mg daily, wife states patient is feeling much better, dizziness has improved and he is not constipated, his bp over the weekend:    Sat 130/67  Sun 146/73  Mon 136/76  Please advise

## 2024-02-15 ENCOUNTER — NURSE ONLY (OUTPATIENT)
Dept: CARDIOLOGY CLINIC | Age: 89
End: 2024-02-15

## 2024-02-15 NOTE — PROGRESS NOTES
Patient presented today and was given  Xarelto 20mg. The medication is being monitored by Dr. Ybarra.   Sample drug name: Xarelto 20mg  Sample drug lot #: [id number]  Sample drug expiration date: 10/25  How many sample boxes and/or bottles given: 4 bottles

## 2024-05-01 ENCOUNTER — TELEPHONE (OUTPATIENT)
Dept: ADMINISTRATIVE | Age: 89
End: 2024-05-01

## 2024-05-01 NOTE — TELEPHONE ENCOUNTER
Rupa called to schedule pt's appt w/Dr Ybarra, first available 10/4 - he was due around 6/18/24; is this okay to wait?   Please advise

## 2024-06-24 ENCOUNTER — TELEPHONE (OUTPATIENT)
Dept: CARDIOLOGY CLINIC | Age: 89
End: 2024-06-24

## 2024-06-24 NOTE — TELEPHONE ENCOUNTER
Patient is scheduled for an excision of basal cell carcinoma on the right temple and needs instructions regarding holding Xarelto, please advise

## 2024-06-25 NOTE — TELEPHONE ENCOUNTER
Okay to proceed. Okay to hold Xarelto for 3 days prior.     Adela Ybarra D.O.  Cardiologist  Cardiology, [facility]

## 2024-07-25 ENCOUNTER — OFFICE VISIT (OUTPATIENT)
Dept: SURGERY | Age: 89
End: 2024-07-25
Payer: COMMERCIAL

## 2024-07-25 ENCOUNTER — TELEPHONE (OUTPATIENT)
Dept: SURGERY | Age: 89
End: 2024-07-25

## 2024-07-25 ENCOUNTER — TELEPHONE (OUTPATIENT)
Dept: CARDIOLOGY CLINIC | Age: 89
End: 2024-07-25

## 2024-07-25 VITALS
SYSTOLIC BLOOD PRESSURE: 117 MMHG | OXYGEN SATURATION: 100 % | DIASTOLIC BLOOD PRESSURE: 63 MMHG | BODY MASS INDEX: 28 KG/M2 | TEMPERATURE: 98.1 F | WEIGHT: 158 LBS | HEART RATE: 91 BPM | HEIGHT: 63 IN

## 2024-07-25 DIAGNOSIS — C44.211 BASAL CELL CARCINOMA (BCC) OF SKIN OF EAR, UNSPECIFIED LATERALITY: Primary | ICD-10-CM

## 2024-07-25 PROCEDURE — 99204 OFFICE O/P NEW MOD 45 MIN: CPT | Performed by: PLASTIC SURGERY

## 2024-07-25 PROCEDURE — 1123F ACP DISCUSS/DSCN MKR DOCD: CPT | Performed by: PLASTIC SURGERY

## 2024-07-25 RX ORDER — MULTIVIT WITH MINERALS/LUTEIN
5000 TABLET ORAL DAILY
COMMUNITY

## 2024-07-25 NOTE — TELEPHONE ENCOUNTER
Patient is going to call once scheduled with PCP and cardiology to schedule surgery at [facility]

## 2024-07-25 NOTE — PROGRESS NOTES
Department of Plastic Surgery - Adult  Attending Consult Note      CHIEF COMPLAINT:   Basal Cell Cancer of Left Posterior Ear    History Obtained From:  patient, spouse    HISTORY OF PRESENT ILLNESS:                The patient is a 88 y.o. male who presents with suspected basal cell cancer of left posterior ear.  The patient states that they first noticed the lesion 4 months ago.  It has grown significantly in size since they first noticed the lesion.  The lesion has not changed in color and has not had discharge or bleeding.  The pt has not had the lesion biopsied previously.  The patient has not had the lesion removed previously. The patient states the lesion is  painful to palpation.  The pt denies any associated symptoms.      Past Medical History:    Past Medical History:   Diagnosis Date    A-fib (HCC)     Arthritis     CAD (coronary artery disease)     HTN (hypertension)     Hyperlipemia     LBBB (left bundle branch block)     MI (myocardial infarction) (HCC)      Past Surgical History:    Past Surgical History:   Procedure Laterality Date    EYE SURGERY Bilateral     cataract removal w lense implants    KNEE SURGERY Right      Current Medications:   No current facility-administered medications for this visit.  Allergies:  Patient has no known allergies.    Social History:   Social History     Socioeconomic History    Marital status:      Spouse name: Not on file    Number of children: Not on file    Years of education: Not on file    Highest education level: Not on file   Occupational History    Not on file   Tobacco Use    Smoking status: Former     Current packs/day: 0.00     Average packs/day: 0.3 packs/day for 20.0 years (6.0 ttl pk-yrs)     Types: Cigarettes     Start date: 1977     Quit date: 1997     Years since quittin.5    Smokeless tobacco: Never   Vaping Use    Vaping Use: Never used   Substance and Sexual Activity    Alcohol use: Yes     Comment: occassionally    Drug use: No

## 2024-07-26 ENCOUNTER — TELEPHONE (OUTPATIENT)
Dept: CARDIOLOGY CLINIC | Age: 89
End: 2024-07-26

## 2024-07-26 NOTE — TELEPHONE ENCOUNTER
Okay to proceed. Okay to hold Xarelto as requested.    Adela Ybarra D.O.  Cardiologist  Cardiology, [facility]

## 2024-07-26 NOTE — TELEPHONE ENCOUNTER
On 6/24 Patient was cleared for an excision of basal cell carcinoma on the right temple with , surgery has been cancelled and will be rescheduled with  in August, patient will hold Xarelto 3 days prior, does clearance still stand?

## 2024-08-19 ENCOUNTER — PREP FOR PROCEDURE (OUTPATIENT)
Dept: SURGERY | Age: 89
End: 2024-08-19

## 2024-08-19 PROBLEM — C44.91 BCC (BASAL CELL CARCINOMA): Status: ACTIVE | Noted: 2024-08-19

## 2024-08-19 NOTE — H&P
Department of Plastic Surgery - Adult  Attending Consult Note        CHIEF COMPLAINT:   Basal Cell Cancer of Left Posterior Ear     History Obtained From:  patient, spouse     HISTORY OF PRESENT ILLNESS:                 The patient is a 88 y.o. male who presents with suspected basal cell cancer of left posterior ear.  The patient states that they first noticed the lesion 4 months ago.  It has grown significantly in size since they first noticed the lesion.  The lesion has not changed in color and has not had discharge or bleeding.  The pt has not had the lesion biopsied previously.  The patient has not had the lesion removed previously. The patient states the lesion is  painful to palpation.  The pt denies any associated symptoms.       Past Medical History:    Past Medical History        Past Medical History:   Diagnosis Date    A-fib (HCC)      Arthritis      CAD (coronary artery disease)      HTN (hypertension)      Hyperlipemia      LBBB (left bundle branch block)      MI (myocardial infarction) (HCC)           Past Surgical History:    Past Surgical History         Past Surgical History:   Procedure Laterality Date    EYE SURGERY Bilateral       cataract removal w lense implants    KNEE SURGERY Right           Current Medications:   Current Hospital Medications   No current facility-administered medications for this visit.     Allergies:  Patient has no known allergies.     Social History:   Social History               Socioeconomic History    Marital status:        Spouse name: Not on file    Number of children: Not on file    Years of education: Not on file    Highest education level: Not on file   Occupational History    Not on file   Tobacco Use    Smoking status: Former       Current packs/day: 0.00       Average packs/day: 0.3 packs/day for 20.0 years (6.0 ttl pk-yrs)       Types: Cigarettes       Start date: 1977       Quit date: 1997       Years since quittin.5    Smokeless tobacco:  possible local tissue rearrangement, possible full thickness skin graft     -The risks, benefits and options were discussed with the pt. The risks included but not limited to pain, bleeding, infection, heavy scarring, damage to surrounding structures, fluid collections, asymmetry, and need for further procedures. All of His questions were answered to their satisfaction and He agrees to proceed with the procedure.    Attestation    No change in patient's H & P. I have reviewed the procedure with the patient as well as the risk and benefits. They have no further questions and agree to proceed with surgery.    Jhoan Craft MD   8:13 AM  8/29/2024

## 2024-08-26 RX ORDER — M-VIT,TX,IRON,MINS/CALC/FOLIC 27MG-0.4MG
1 TABLET ORAL DAILY
COMMUNITY

## 2024-08-26 NOTE — PROGRESS NOTES
[facility]   PRE-ADMISSION TESTING GENERAL INSTRUCTIONS  PAT Phone Number: [phone]      GENERAL INSTRUCTIONS:    [x] Antibacterial Soap Shower Night before AND the Morning of procedure.    [x] Do not wear makeup, lotions, powders, deodorant the morning of surgery.  [x] No solid food after midnight. You may have SIPS of clear liquids up until 2 hours before your arrival time to the hospital.   [x] You may brush your teeth, gargle, but do not swallow water.   [x] No tobacco products, illegal drugs, or alcohol within 24 hours of your surgery.  [x] Jewelry or valuables should not be brought to the hospital. All body and/or tongue piercing's must be removed prior to arriving to hospital. No contact lens or hair pins.   [x] Arrange transportation with a responsible adult  to and from the hospital. Arrange for someone to be with you for the remainder of the day and for 24 hours after your procedure due to having had anesthesia.          -Who will be your  for transportation? Rupa, wife        -Who will be staying with you for 24 hrs after your procedure? wife  [x] Bring insurance card and photo ID.       PARKING INSTRUCTIONS:     [x] ARRIVAL DATE & TIME: 8/29 7:45 am  [x] Times are subject to change. We will contact you the business day before surgery if that were to occur.  [x] Enter into the [facility] Entrance. Two people may accompany you. Masks are not required.  [x] Parking Lot \"I\" is where you will park. It is located on the corner of [address] and [address]. The entrance is on [address].   Only one vehicle - per patient, is permitted in parking lot.   Upon entering the parking lot, a voucher ticket will print.    EDUCATION INSTRUCTIONS:             [x] Pain: Post-op pain is normal and to be expected. You will be asked to rate your pain from 0-10.    MEDICATION INSTRUCTIONS:    [x] Bring a complete list of your medications, please write the last  time you took the medicine, give this list to the nurse in Pre-Op.  [x] Take ONLY the following medications the morning of surgery: famotidine (Pepcid), nifedipine (Procardia XL), metoprolol succinate (Toprol XL)  May take the morning of surgery if needed with a sip of water: acetaminophen (Tylenol)  [x] Stop all herbal supplements and vitamins 5 days before surgery. Stop NSAIDS 7 days before surgery.    [x] Follow physician instructions regarding any blood thinners you may be taking.  Last day to take rivaroxaban (xarelto) 8/25    WHAT TO EXPECT:    [x] The day of surgery you will be greeted and checked in by the [facility] . In addition, you will be registered in the [facility] by a Patient Access Representative. Please bring your photo ID and insurance card. A nurse will greet you in accordance to the time you are needed in the pre-op area to prepare you for surgery. Please do not be discouraged if you are not greeted in the order you arrive as there are many variables that are involved in patient preparation. Your patience is greatly appreciated as you wait for your nurse.   [x] Delays may occur with surgery and staff will make a sincere effort to keep you informed of delays. If any delays occur with your procedure, we apologize ahead of time for your inconvenience as we recognize the value of your time.

## 2024-08-29 ENCOUNTER — ANESTHESIA (OUTPATIENT)
Dept: OPERATING ROOM | Age: 89
End: 2024-08-29
Payer: MEDICARE

## 2024-08-29 ENCOUNTER — HOSPITAL ENCOUNTER (OUTPATIENT)
Age: 89
Setting detail: OUTPATIENT SURGERY
Discharge: HOME | End: 2024-08-29
Attending: PLASTIC SURGERY | Admitting: PLASTIC SURGERY
Payer: MEDICARE

## 2024-08-29 ENCOUNTER — ANESTHESIA EVENT (OUTPATIENT)
Dept: OPERATING ROOM | Age: 89
End: 2024-08-29
Payer: MEDICARE

## 2024-08-29 VITALS
OXYGEN SATURATION: 96 % | RESPIRATION RATE: 18 BRPM | SYSTOLIC BLOOD PRESSURE: 138 MMHG | HEART RATE: 71 BPM | TEMPERATURE: 97.7 F | WEIGHT: 153 LBS | HEIGHT: 63 IN | BODY MASS INDEX: 27.11 KG/M2 | DIASTOLIC BLOOD PRESSURE: 75 MMHG

## 2024-08-29 DIAGNOSIS — G89.18 POST-OP PAIN: Primary | ICD-10-CM

## 2024-08-29 DIAGNOSIS — C44.91 BCC (BASAL CELL CARCINOMA): ICD-10-CM

## 2024-08-29 PROCEDURE — 88332 PATH CONSLTJ SURG EA ADD BLK: CPT

## 2024-08-29 PROCEDURE — 3600000012 HC SURGERY LEVEL 2 ADDTL 15MIN: Performed by: PLASTIC SURGERY

## 2024-08-29 PROCEDURE — 7100000010 HC PHASE II RECOVERY - FIRST 15 MIN: Performed by: PLASTIC SURGERY

## 2024-08-29 PROCEDURE — 2580000003 HC RX 258: Performed by: PLASTIC SURGERY

## 2024-08-29 PROCEDURE — 3700000001 HC ADD 15 MINUTES (ANESTHESIA): Performed by: PLASTIC SURGERY

## 2024-08-29 PROCEDURE — 2709999900 HC NON-CHARGEABLE SUPPLY: Performed by: PLASTIC SURGERY

## 2024-08-29 PROCEDURE — 6360000002 HC RX W HCPCS: Performed by: PLASTIC SURGERY

## 2024-08-29 PROCEDURE — 6370000000 HC RX 637 (ALT 250 FOR IP): Performed by: PLASTIC SURGERY

## 2024-08-29 PROCEDURE — 88305 TISSUE EXAM BY PATHOLOGIST: CPT

## 2024-08-29 PROCEDURE — NBSRV NON-BILLABLE SERVICE: Performed by: PHYSICIAN ASSISTANT

## 2024-08-29 PROCEDURE — 7100000011 HC PHASE II RECOVERY - ADDTL 15 MIN: Performed by: PLASTIC SURGERY

## 2024-08-29 PROCEDURE — 88331 PATH CONSLTJ SURG 1 BLK 1SPC: CPT

## 2024-08-29 PROCEDURE — 6360000002 HC RX W HCPCS

## 2024-08-29 PROCEDURE — 3700000000 HC ANESTHESIA ATTENDED CARE: Performed by: PLASTIC SURGERY

## 2024-08-29 PROCEDURE — 14060 TIS TRNFR E/N/E/L 10 SQ CM/<: CPT | Performed by: PLASTIC SURGERY

## 2024-08-29 PROCEDURE — 3600000002 HC SURGERY LEVEL 2 BASE: Performed by: PLASTIC SURGERY

## 2024-08-29 PROCEDURE — 2500000003 HC RX 250 WO HCPCS: Performed by: PLASTIC SURGERY

## 2024-08-29 RX ORDER — SODIUM CHLORIDE 0.9 % (FLUSH) 0.9 %
5-40 SYRINGE (ML) INJECTION PRN
Status: DISCONTINUED | OUTPATIENT
Start: 2024-08-29 | End: 2024-08-29 | Stop reason: HOSPADM

## 2024-08-29 RX ORDER — SODIUM CHLORIDE 0.9 % (FLUSH) 0.9 %
5-40 SYRINGE (ML) INJECTION EVERY 12 HOURS SCHEDULED
Status: DISCONTINUED | OUTPATIENT
Start: 2024-08-29 | End: 2024-08-29 | Stop reason: HOSPADM

## 2024-08-29 RX ORDER — MORPHINE SULFATE 2 MG/ML
2 INJECTION, SOLUTION INTRAMUSCULAR; INTRAVENOUS EVERY 5 MIN PRN
Status: DISCONTINUED | OUTPATIENT
Start: 2024-08-29 | End: 2024-08-29 | Stop reason: HOSPADM

## 2024-08-29 RX ORDER — BACITRACIN ZINC 500 [USP'U]/G
OINTMENT TOPICAL 2 TIMES DAILY
Status: CANCELLED | OUTPATIENT
Start: 2024-08-29

## 2024-08-29 RX ORDER — SODIUM CHLORIDE 9 MG/ML
INJECTION, SOLUTION INTRAVENOUS PRN
Status: DISCONTINUED | OUTPATIENT
Start: 2024-08-29 | End: 2024-08-29 | Stop reason: HOSPADM

## 2024-08-29 RX ORDER — LIDOCAINE HYDROCHLORIDE AND EPINEPHRINE 10; 10 MG/ML; UG/ML
INJECTION, SOLUTION INFILTRATION; PERINEURAL PRN
Status: DISCONTINUED | OUTPATIENT
Start: 2024-08-29 | End: 2024-08-29 | Stop reason: ALTCHOICE

## 2024-08-29 RX ORDER — FENTANYL CITRATE 50 UG/ML
25 INJECTION, SOLUTION INTRAMUSCULAR; INTRAVENOUS EVERY 5 MIN PRN
Status: DISCONTINUED | OUTPATIENT
Start: 2024-08-29 | End: 2024-08-29 | Stop reason: HOSPADM

## 2024-08-29 RX ORDER — GINSENG 100 MG
CAPSULE ORAL
Qty: 28.4 DEVICE | Refills: 1 | Status: SHIPPED | OUTPATIENT
Start: 2024-08-29

## 2024-08-29 RX ORDER — ONDANSETRON 2 MG/ML
4 INJECTION INTRAMUSCULAR; INTRAVENOUS
Status: DISCONTINUED | OUTPATIENT
Start: 2024-08-29 | End: 2024-08-29 | Stop reason: HOSPADM

## 2024-08-29 RX ORDER — CEPHALEXIN 500 MG/1
500 CAPSULE ORAL EVERY 6 HOURS SCHEDULED
Status: CANCELLED | OUTPATIENT
Start: 2024-08-29

## 2024-08-29 RX ORDER — HYDROCODONE BITARTRATE AND ACETAMINOPHEN 5; 325 MG/1; MG/1
1 TABLET ORAL EVERY 4 HOURS PRN
Qty: 10 TABLET | Refills: 0 | Status: SHIPPED | OUTPATIENT
Start: 2024-08-29 | End: 2024-09-05

## 2024-08-29 RX ORDER — PROPOFOL 10 MG/ML
INJECTION, EMULSION INTRAVENOUS CONTINUOUS PRN
Status: DISCONTINUED | OUTPATIENT
Start: 2024-08-29 | End: 2024-08-29 | Stop reason: SDUPTHER

## 2024-08-29 RX ORDER — SODIUM CHLORIDE 9 MG/ML
INJECTION, SOLUTION INTRAVENOUS CONTINUOUS
Status: DISCONTINUED | OUTPATIENT
Start: 2024-08-29 | End: 2024-08-29 | Stop reason: HOSPADM

## 2024-08-29 RX ORDER — NALOXONE HYDROCHLORIDE 0.4 MG/ML
INJECTION, SOLUTION INTRAMUSCULAR; INTRAVENOUS; SUBCUTANEOUS PRN
Status: DISCONTINUED | OUTPATIENT
Start: 2024-08-29 | End: 2024-08-29 | Stop reason: HOSPADM

## 2024-08-29 RX ORDER — LIDOCAINE HYDROCHLORIDE 20 MG/ML
INJECTION, SOLUTION INTRAVENOUS PRN
Status: DISCONTINUED | OUTPATIENT
Start: 2024-08-29 | End: 2024-08-29 | Stop reason: SDUPTHER

## 2024-08-29 RX ORDER — CEPHALEXIN 500 MG/1
500 CAPSULE ORAL 4 TIMES DAILY
Qty: 20 CAPSULE | Refills: 0 | Status: SHIPPED | OUTPATIENT
Start: 2024-08-29 | End: 2024-09-03

## 2024-08-29 RX ORDER — CEPHALEXIN 250 MG/5ML
250 POWDER, FOR SUSPENSION ORAL EVERY 6 HOURS SCHEDULED
Status: CANCELLED | OUTPATIENT
Start: 2024-08-29

## 2024-08-29 RX ORDER — FENTANYL CITRATE 50 UG/ML
INJECTION, SOLUTION INTRAMUSCULAR; INTRAVENOUS PRN
Status: DISCONTINUED | OUTPATIENT
Start: 2024-08-29 | End: 2024-08-29 | Stop reason: SDUPTHER

## 2024-08-29 RX ADMIN — CEFAZOLIN 2000 MG: 2 INJECTION, POWDER, FOR SOLUTION INTRAMUSCULAR; INTRAVENOUS at 10:00

## 2024-08-29 RX ADMIN — FENTANYL CITRATE 25 MCG: 50 INJECTION, SOLUTION INTRAMUSCULAR; INTRAVENOUS at 10:07

## 2024-08-29 RX ADMIN — PROPOFOL 20 MG: 10 INJECTION, EMULSION INTRAVENOUS at 10:30

## 2024-08-29 RX ADMIN — SODIUM CHLORIDE: 9 INJECTION, SOLUTION INTRAVENOUS at 08:28

## 2024-08-29 RX ADMIN — PROPOFOL 30 MG: 10 INJECTION, EMULSION INTRAVENOUS at 10:00

## 2024-08-29 RX ADMIN — FENTANYL CITRATE 25 MCG: 50 INJECTION, SOLUTION INTRAMUSCULAR; INTRAVENOUS at 09:58

## 2024-08-29 RX ADMIN — LIDOCAINE HYDROCHLORIDE 40 MG: 20 INJECTION, SOLUTION INTRAVENOUS at 09:58

## 2024-08-29 RX ADMIN — PROPOFOL 75 MCG/KG/MIN: 10 INJECTION, EMULSION INTRAVENOUS at 09:59

## 2024-08-29 ASSESSMENT — PAIN - FUNCTIONAL ASSESSMENT
PAIN_FUNCTIONAL_ASSESSMENT: 0-10
PAIN_FUNCTIONAL_ASSESSMENT: NONE - DENIES PAIN

## 2024-08-29 ASSESSMENT — PAIN SCALES - GENERAL: PAINLEVEL_OUTOF10: 0

## 2024-08-29 NOTE — OP NOTE
[facility]              [address] [place] [address]                            OPERATIVE REPORT      PATIENT NAME: YFN WHITLOCK             : 1935  MED REC NO: [id number]                        ROOM: [facility]  ACCOUNT NO: [id number]                       ADMIT DATE: 2024  PROVIDER: Blaine Bonner DO      DATE OF PROCEDURE:  2024    SURGEON:  Jhoan Craft MD    PREOPERATIVE DIAGNOSIS:  Basal cell carcinoma, left postauricular dermis.    POSTOPERATIVE DIAGNOSIS:  Basal cell carcinoma, left postauricular dermis.    PROCEDURE:  Excision of basal carcinoma of the left postauricular dermis with rhomboid flap closure, lesion measured 25 x 20 mm, margins were 2 mm, defect measured 29 x 24 mm, and rhomboid flap measured 35 x 35 mm.    ASSISTANTS:  Physician assistant, David Chaudhry, and resident, Blaine Bonner DO.    ANESTHESIA:  Monitored anesthesia care.    ESTIMATED BLOOD LOSS:  5 mL.    IV FLUIDS:  800 mL crystalloid.    FINDINGS:  Basal carcinoma of the left postauricular dermis.    INDICATIONS:  This is an 88-year-old male with biopsy-proven basal carcinoma of the left postauricular dermis.  Recommendation was for wide local excision with local flap closure versus full-thickness skin graft.  Risks, benefits, and alternatives were thoroughly discussed with him including but not limited to risk of bleeding, infection, scarring, damage to surrounding structures, fluid collection, asymmetry, and need for further procedures.  He verbalized understanding and agreed to proceed.    DESCRIPTION OF PROCEDURE:  The patient was identified preoperatively, brought back to the operating room, and left supine on the bed and turned over to anesthesia for monitored anesthesia care.  Proper time-out was performed.  Preoperative antibiotics were given.  SCDs were on and functioning.  The patient was prepped and draped in the usual sterile fashion.

## 2024-08-29 NOTE — DISCHARGE INSTRUCTIONS
Discharge Home.   Call office to schedule a follow-up appointment at [phone]  Please call to schedule an appointment to be seen In our office on Monday 9-9-24   Diet: regular diet  Activity: ambulate in house and no Strenuous exercise for 1 week  Shower/Bathing:  You can shower in 48 hours over the incision site.  At that time you can allow soap and water to rinse off the incision site while showering.  Once you are done in the shower you can pat dry the incision site with a clean paper towel.  No baths, hot tubs or soaking of the wound site at this time.   Dressings /Splint /Wound Care:Keep wound clean and dry.  Apply bacitracin  to the wound site two times daily.  Driving: It is OK to drive if the following criteria are met:   1- Not taking narcotic pain medication   2- Not distracted by pain or limited ROM   3- Not a hazard to self or others on the road  Medications: As prescribed.  Educated to contact physician at [phone] with concerns or signs of infection (redness, increasing pain, discharge, odor, fever).

## 2024-08-29 NOTE — PROGRESS NOTES
Pt s wife states that the prescriptions should be sent to [facility] on [address] and not [address]. Junior appiah served after this RN changed the pharmacy in [employer]. . Called [facility] to confirm RX have been escribed . Pharmacy technician confirmed

## 2024-08-29 NOTE — PROGRESS NOTES
Patient armband verified with patient and placed on patient. Patient IV fluids rate decreased after placement of IV.

## 2024-08-29 NOTE — ANESTHESIA PRE PROCEDURE
Department of Anesthesiology  Preprocedure Note       Name:  Lamberto Dotson   Age:  88 y.o.  :  1935                                          MRN:  [id number]         Date:  2024      Surgeon: Surgeon(s):  Jhoan Craft MD    Procedure: Procedure(s):  excision of basal cell carcinoma of left post auricular dermis with possible local tissue rearrangement (FROZEN)  possible FULL THICKNESS skin graft    Medications prior to admission:   Prior to Admission medications    Medication Sig Start Date End Date Taking? Authorizing Provider   Multiple Vitamins-Minerals (THERAPEUTIC MULTIVITAMIN-MINERALS) tablet Take 1 tablet by mouth daily   Yes ProviderKevin MD   NIFEdipine (PROCARDIA XL) 60 MG extended release tablet Take 1 tablet by mouth daily  Patient taking differently: Take 30 mg by mouth every morning Takes half tablet of 50 mg 24  Yes Adela Ybarra DO   metoprolol succinate (TOPROL XL) 100 MG extended release tablet Take 1 tablet by mouth every morning   Yes Kevin Morton MD   acetaminophen (TYLENOL) 500 MG tablet Take 1 tablet by mouth every 6 hours as needed for Pain   Yes Provider, MD Kevin   atorvastatin (LIPITOR) 10 MG tablet Take 1 tablet by mouth daily   Yes Provider, MD Kevin   potassium chloride (KLOR-CON M) 20 MEQ extended release tablet Take 1 tablet by mouth every morning 19  Yes ProviderKevin MD   finasteride (PROSCAR) 5 MG tablet Take 1 tablet by mouth Daily with lunch   Yes ProviderKevin MD   levothyroxine (SYNTHROID) 50 MCG tablet Take 1.5 tablets by mouth Daily   Yes ProviderKevin MD   famotidine (PEPCID) 20 MG tablet Take 1 tablet by mouth 2 times daily 11/12/15  Yes ProviderKevin MD   doxazosin (CARDURA) 8 MG tablet Take 1 tablet by mouth nightly   Yes Provider, MD Kevin   Losartan Potassium-HCTZ (HYZAAR PO) Take  by mouth daily. 100-25 mg   Yes ProviderKevin MD   vitamin E 1000 units capsule Take  patient.  Patient verbalized an understanding and agreed to proceed.  NPO status confirmed.    Anesthetic plan discussed with care team members and agreed upon.    Dennis Shirley DO   8/29/2024  9:17 AM

## 2024-08-29 NOTE — PROGRESS NOTES
1106- Pt verified using 2 Identifiers and ID band with OR staff prior to acceptance to PACU/Phase II care.     Patient sitting up in bed and will be offered PO. Wife will be called bedside.

## 2024-08-29 NOTE — PROGRESS NOTES
INTRAOPERATIVE CONSULTATION (with FROZEN SECTION)    Skin, left posterior auricular for Frozen Section:  FS1: Lateral and superior representative peripheral and deep margins NEGATIVE for carcinoma.  FS2:  Medial (verified orientation with Dr. Craft) representative peripheral and deep margins POSITIVE for carcinoma.

## 2024-08-29 NOTE — ANESTHESIA POSTPROCEDURE EVALUATION
Department of Anesthesiology  Postprocedure Note    Patient: Lamberto Dotson  MRN: [id number]  YOB: 1935  Date of evaluation: 8/29/2024    Procedure Summary       Date: 08/29/24 Room / Location: [address]    Anesthesia Start: 0952 Anesthesia Stop: 1109    Procedure: excision of basal cell carcinoma of left post auricular dermis with rhomboid flap (Left: Ear) Diagnosis:       BCC (basal cell carcinoma)      (BCC (basal cell carcinoma) [C44.91])    Surgeons: Jhoan Craft MD Responsible Provider: Dennis Shirley DO    Anesthesia Type: MAC ASA Status: 3            Anesthesia Type: No value filed.    Teodora Phase I: Teodora Score: 10    Teodora Phase II: Teodora Score: 10    Anesthesia Post Evaluation    Patient location during evaluation: PACU  Patient participation: complete - patient participated  Level of consciousness: awake and alert  Pain score: 1  Airway patency: patent  Nausea & Vomiting: no nausea and no vomiting  Cardiovascular status: hemodynamically stable  Respiratory status: acceptable  Hydration status: euvolemic  Pain management: adequate    No notable events documented.

## 2024-09-06 LAB — SURGICAL PATHOLOGY REPORT: NORMAL

## 2024-09-09 ENCOUNTER — OFFICE VISIT (OUTPATIENT)
Dept: SURGERY | Age: 89
End: 2024-09-09

## 2024-09-09 VITALS — TEMPERATURE: 98.2 F

## 2024-09-09 DIAGNOSIS — C44.211 BASAL CELL CARCINOMA (BCC) OF SKIN OF EAR, UNSPECIFIED LATERALITY: Primary | ICD-10-CM

## 2024-09-09 PROCEDURE — 99024 POSTOP FOLLOW-UP VISIT: CPT | Performed by: PHYSICIAN ASSISTANT

## 2024-10-04 ENCOUNTER — OFFICE VISIT (OUTPATIENT)
Dept: CARDIOLOGY CLINIC | Age: 89
End: 2024-10-04
Payer: COMMERCIAL

## 2024-10-04 VITALS
RESPIRATION RATE: 18 BRPM | HEIGHT: 61 IN | WEIGHT: 156.6 LBS | SYSTOLIC BLOOD PRESSURE: 96 MMHG | DIASTOLIC BLOOD PRESSURE: 58 MMHG | HEART RATE: 63 BPM | BODY MASS INDEX: 29.57 KG/M2

## 2024-10-04 DIAGNOSIS — I48.19 PERSISTENT ATRIAL FIBRILLATION (HCC): Primary | ICD-10-CM

## 2024-10-04 PROCEDURE — 99214 OFFICE O/P EST MOD 30 MIN: CPT | Performed by: INTERNAL MEDICINE

## 2024-10-04 PROCEDURE — 1123F ACP DISCUSS/DSCN MKR DOCD: CPT | Performed by: INTERNAL MEDICINE

## 2024-10-04 PROCEDURE — 93000 ELECTROCARDIOGRAM COMPLETE: CPT | Performed by: INTERNAL MEDICINE

## 2024-10-04 NOTE — PROGRESS NOTES
Neuro: denies numbness, tingling, tremors.   Skin: denies rashes or itching.   : denies hematuria, dysuria   GI: denies vomiting, diarrhea   Psych: denies mood changed, anxiety, depression.  all others negative.    Physical Exam   BP (!) 96/58   Pulse 63   Resp 18   Ht 1.549 m (5' 1\")   Wt 71 kg (156 lb 9.6 oz)   BMI 29.59 kg/m²   Constitutional: Oriented to person, place, and time. Well-developed and well-nourished. No distress.    Head: Normocephalic and atraumatic.   Eyes: EOM are normal. Pupils are equal, round, and reactive to light.   Neck: Normal range of motion. Neck supple. No hepatojugular reflux and no JVD present. Carotid bruit is not present. No tracheal deviation present. No thyromegaly present.   Cardiovascular: Normal rate, irregular rhythm, normal heart sounds and intact distal pulses.  Exam reveals no gallop and no friction rub.  No murmur heard.  Pulmonary/Chest: Effort normal and breath sounds normal. No respiratory distress. No wheezes. No rales. No tenderness.   Abdominal: Soft. Bowel sounds are normal. No distension and no mass. No tenderness. No rebound and no guarding.   Musculoskeletal: Normal range of motion. No edema and no tenderness.   Lymphadenopathy:   No cervical adenopathy.   Neurological: Alert and oriented to person, place, and time.   Skin: Skin is warm and dry. No rash noted. Not diaphoretic. No erythema.   Psychiatric: Normal mood and affect. Behavior is normal.     EKG: Afib. LBBB.    Stress Impression 5/4/2021:     1. ECG during the infusion did not change, with baseline atrial fibrillation.   2. The myocardial perfusion imaging was abnormal.     The abnormality was a a large sized fixed defect in the septal and apical wall suggestive of a prior MI.   3. Overall left ventricular systolic function was normal with septal and apical hypokinesis.   4. Low risk post MI pharmacologic stress test.     ASSESSMENT AND PLAN:  1. CAD/Chest pain:     Stress low risk 5/4/2021.

## 2024-11-26 ENCOUNTER — TELEPHONE (OUTPATIENT)
Dept: CARDIOLOGY CLINIC | Age: 89
End: 2024-11-26

## 2024-11-26 RX ORDER — HYDRALAZINE HYDROCHLORIDE 25 MG/1
25 TABLET, FILM COATED ORAL 3 TIMES DAILY
COMMUNITY
End: 2024-11-26 | Stop reason: SDUPTHER

## 2024-11-26 RX ORDER — HYDRALAZINE HYDROCHLORIDE 25 MG/1
25 TABLET, FILM COATED ORAL 3 TIMES DAILY
Qty: 90 TABLET | Refills: 5 | Status: SHIPPED
Start: 2024-11-26 | End: 2024-11-27

## 2024-11-26 NOTE — TELEPHONE ENCOUNTER
Start hydralazine 25 mg tid.     OV tomorrow.     Adela Ybarra D.O.  Cardiologist  Cardiology, [facility]

## 2024-11-26 NOTE — TELEPHONE ENCOUNTER
Wife states that bp has been elevated for a week, she tried giving patient an extra dose of toprol and an extra dose of losartan but it did not help:    170/97  193/100  203/104

## 2024-11-27 ENCOUNTER — OFFICE VISIT (OUTPATIENT)
Dept: CARDIOLOGY CLINIC | Age: 89
End: 2024-11-27
Payer: COMMERCIAL

## 2024-11-27 VITALS
HEIGHT: 62 IN | SYSTOLIC BLOOD PRESSURE: 156 MMHG | HEART RATE: 62 BPM | WEIGHT: 148 LBS | OXYGEN SATURATION: 95 % | RESPIRATION RATE: 18 BRPM | BODY MASS INDEX: 27.23 KG/M2 | DIASTOLIC BLOOD PRESSURE: 60 MMHG | TEMPERATURE: 97.7 F

## 2024-11-27 DIAGNOSIS — I48.19 PERSISTENT ATRIAL FIBRILLATION (HCC): Primary | ICD-10-CM

## 2024-11-27 PROCEDURE — 1159F MED LIST DOCD IN RCRD: CPT | Performed by: INTERNAL MEDICINE

## 2024-11-27 PROCEDURE — 1123F ACP DISCUSS/DSCN MKR DOCD: CPT | Performed by: INTERNAL MEDICINE

## 2024-11-27 PROCEDURE — 99214 OFFICE O/P EST MOD 30 MIN: CPT | Performed by: INTERNAL MEDICINE

## 2024-11-27 PROCEDURE — 93000 ELECTROCARDIOGRAM COMPLETE: CPT | Performed by: INTERNAL MEDICINE

## 2024-11-27 RX ORDER — NIFEDIPINE 30 MG/1
30 TABLET, EXTENDED RELEASE ORAL DAILY
Qty: 90 TABLET | Refills: 3 | Status: SHIPPED | OUTPATIENT
Start: 2024-11-27

## 2024-11-27 RX ORDER — ERGOCALCIFEROL 1.25 MG/1
5000 CAPSULE, LIQUID FILLED ORAL DAILY
COMMUNITY

## 2024-11-27 RX ORDER — HYDRALAZINE HYDROCHLORIDE 25 MG/1
25 TABLET, FILM COATED ORAL EVERY 8 HOURS PRN
Qty: 90 TABLET | Refills: 5 | Status: SHIPPED | OUTPATIENT
Start: 2024-11-27

## 2024-11-27 NOTE — PROGRESS NOTES
tablet Take 1 tablet by mouth daily      nitroGLYCERIN (NITROSTAT) 0.4 MG SL tablet Place 1 tablet under the tongue as needed for Chest pain 25 tablet 1    potassium chloride (KLOR-CON M) 20 MEQ extended release tablet Take 1 tablet by mouth every morning      Cholecalciferol (VITAMIN D3 PO) Take 5,000 Units by mouth daily      finasteride (PROSCAR) 5 MG tablet Take 1 tablet by mouth Daily with lunch      levothyroxine (SYNTHROID) 50 MCG tablet Take 1.5 tablets by mouth Daily      famotidine (PEPCID) 20 MG tablet Take 1 tablet by mouth 2 times daily      Losartan Potassium-HCTZ (HYZAAR PO) Take  by mouth daily. 100-25 mg      doxazosin (CARDURA) 8 MG tablet Take 1 tablet by mouth nightly (Patient not taking: Reported on 2024)       No current facility-administered medications for this visit.       Social History     Socioeconomic History    Marital status:      Spouse name: Not on file    Number of children: Not on file    Years of education: Not on file    Highest education level: Not on file   Occupational History    Not on file   Tobacco Use    Smoking status: Former     Current packs/day: 0.00     Average packs/day: 0.3 packs/day for 20.0 years (6.0 ttl pk-yrs)     Types: Cigarettes     Start date: 1977     Quit date: 1997     Years since quittin.9    Smokeless tobacco: Never   Vaping Use    Vaping status: Never Used   Substance and Sexual Activity    Alcohol use: Yes     Alcohol/week: 1.0 standard drink of alcohol     Types: 1 Glasses of wine per week     Comment: occassionally    Drug use: No    Sexual activity: Not on file   Other Topics Concern    Not on file   Social History Narrative    Not on file     Social Determinants of Health     Financial Resource Strain: Not on file   Food Insecurity: Not on file   Transportation Needs: Not on file   Physical Activity: Not on file   Stress: Not on file   Social Connections: Not on file   Intimate Partner Violence: Not on file   Housing

## 2024-12-04 ENCOUNTER — TELEPHONE (OUTPATIENT)
Dept: CARDIOLOGY CLINIC | Age: 89
End: 2024-12-04

## 2024-12-04 NOTE — TELEPHONE ENCOUNTER
Wife called with a bp update, patient has Covid and bp has bee fluctuating a lot:     BP  P  146/79  138/71            69  155/84  76  143/75  72  157/86  82  135/69    Patient has been taking robitussin, musinex and tylenol, please advise

## 2024-12-04 NOTE — TELEPHONE ENCOUNTER
Continue the nifedipine at same dose and okay to take the other medications.    Adela Ybarra D.O.  Cardiologist  Cardiology, [facility]

## 2025-02-06 RX ORDER — RIVAROXABAN 20 MG/1
20 TABLET, FILM COATED ORAL
Qty: 90 TABLET | Refills: 3 | Status: SHIPPED | OUTPATIENT
Start: 2025-02-06

## 2025-02-27 ENCOUNTER — OFFICE VISIT (OUTPATIENT)
Dept: CARDIOLOGY CLINIC | Age: 89
End: 2025-02-27
Payer: MEDICARE

## 2025-02-27 VITALS
HEART RATE: 64 BPM | HEIGHT: 62 IN | BODY MASS INDEX: 28.87 KG/M2 | SYSTOLIC BLOOD PRESSURE: 118 MMHG | DIASTOLIC BLOOD PRESSURE: 60 MMHG | OXYGEN SATURATION: 95 % | TEMPERATURE: 97.3 F | WEIGHT: 156.9 LBS | RESPIRATION RATE: 18 BRPM

## 2025-02-27 DIAGNOSIS — I48.19 PERSISTENT ATRIAL FIBRILLATION (HCC): Primary | ICD-10-CM

## 2025-02-27 PROCEDURE — 93000 ELECTROCARDIOGRAM COMPLETE: CPT | Performed by: INTERNAL MEDICINE

## 2025-02-27 PROCEDURE — 99214 OFFICE O/P EST MOD 30 MIN: CPT | Performed by: INTERNAL MEDICINE

## 2025-02-27 PROCEDURE — 1123F ACP DISCUSS/DSCN MKR DOCD: CPT | Performed by: INTERNAL MEDICINE

## 2025-02-27 PROCEDURE — 1159F MED LIST DOCD IN RCRD: CPT | Performed by: INTERNAL MEDICINE

## 2025-02-27 RX ORDER — PANTOPRAZOLE SODIUM 40 MG/1
40 TABLET, DELAYED RELEASE ORAL DAILY
COMMUNITY
Start: 2025-01-29

## 2025-02-27 NOTE — PROGRESS NOTES
ANAI WHITLOCK            OFFICE VISIT   :  37    CHIEF COMPLAINT: CAD/Afib    HPI: Patient is a 89 y.o. male with a past medical history significant for CAD, status post acute myocardial infarction on 12/3/10 at which time patient went to the lab emergently after transferring from [place] to [facility], which had a catheterization that showed total occlusion to the mid LAD with high grade stenosis in his proximal LAD.  Patient received two bare metal stents.  The other remaining vessels appeared to have no significant stenoses and at that juncture, patient appeared to be fully revascularized.  Patient also has a history of hypertension and hyperlipidemia.      In afib.     No CP or SOB.     Patient denies any symptoms that suggest heart failure.       BP very good.     Past Medical History:   Diagnosis Date    A-fib (HCC)     Arthritis     CAD (coronary artery disease)     HTN (hypertension)     Hyperlipemia     LBBB (left bundle branch block)     MI (myocardial infarction) ([facility])        No Known Allergies    Current Outpatient Medications   Medication Sig Dispense Refill    pantoprazole (PROTONIX) 40 MG tablet Take 1 tablet by mouth daily      XARELTO 20 MG TABS tablet TAKE 1 TABLET DAILY WITH   BREAKFAST 90 tablet 3    vitamin D (ERGOCALCIFEROL) 1.25 MG (69880 UT) CAPS capsule Take 5,000 Units by mouth daily      doxazosin (CARDURA XL) 8 MG extended release tablet Take 1 tablet by mouth at bedtime      NIFEdipine (PROCARDIA XL) 30 MG extended release tablet Take 1 tablet by mouth daily 90 tablet 3    hydrALAZINE (APRESOLINE) 25 MG tablet Take 1 tablet by mouth every 8 hours as needed (for SBP over 160) 90 tablet 5    Multiple Vitamins-Minerals (THERAPEUTIC MULTIVITAMIN-MINERALS) tablet Take 1 tablet by mouth daily      metoprolol succinate (TOPROL XL) 100 MG extended release tablet Take 1 tablet by mouth every morning      acetaminophen (TYLENOL) 500 MG tablet Take 1 tablet by mouth every

## 2025-03-07 ENCOUNTER — TELEPHONE (OUTPATIENT)
Dept: SURGERY | Age: 89
End: 2025-03-07

## 2025-03-07 NOTE — TELEPHONE ENCOUNTER
Wife stated her  has a small area on left ear no redness,discharge,or pain she will would like to come and have it checked by doctor the soonest they can come in is next wed, appmt., scheduled.

## 2025-03-19 ENCOUNTER — OFFICE VISIT (OUTPATIENT)
Dept: SURGERY | Age: 89
End: 2025-03-19
Payer: MEDICARE

## 2025-03-19 VITALS — TEMPERATURE: 98.4 F

## 2025-03-19 DIAGNOSIS — C44.211 BASAL CELL CARCINOMA (BCC) OF SKIN OF EAR, UNSPECIFIED LATERALITY: Primary | ICD-10-CM

## 2025-03-19 PROCEDURE — 99213 OFFICE O/P EST LOW 20 MIN: CPT | Performed by: PLASTIC SURGERY

## 2025-03-19 PROCEDURE — 1123F ACP DISCUSS/DSCN MKR DOCD: CPT | Performed by: PLASTIC SURGERY

## 2025-03-19 PROCEDURE — 1159F MED LIST DOCD IN RCRD: CPT | Performed by: PLASTIC SURGERY

## 2025-03-19 NOTE — PROGRESS NOTES
concerns or signs of infection.    Blaine Bonner DO   2:15 PM  3/19/2025      Attending Physician Statement  I have discussed the case, including pertinent history and exam findings with the resident. I have seen and examined the patient and the key elements of all parts of the encounter have been performed by me. I agree with the assessment, exam, plan and orders as documented by the resident.        I attest that the patient was seen and examined by me, and concur with the documentation above. I agree with the assessment and the plan outlined.    This document is generated, in part, by voice recognition software and thus  syntax and grammatical errors are possible.    Jhoan Craft

## 2025-06-25 ENCOUNTER — HOSPITAL ENCOUNTER (EMERGENCY)
Age: 89
Discharge: HOME | End: 2025-06-25
Payer: MEDICARE

## 2025-06-25 ENCOUNTER — APPOINTMENT (OUTPATIENT)
Dept: CT IMAGING | Age: 89
End: 2025-06-25
Payer: MEDICARE

## 2025-06-25 ENCOUNTER — APPOINTMENT (OUTPATIENT)
Dept: GENERAL RADIOLOGY | Age: 89
End: 2025-06-25
Payer: MEDICARE

## 2025-06-25 VITALS
SYSTOLIC BLOOD PRESSURE: 109 MMHG | RESPIRATION RATE: 18 BRPM | TEMPERATURE: 98.1 F | BODY MASS INDEX: 27.29 KG/M2 | OXYGEN SATURATION: 98 % | HEART RATE: 75 BPM | HEIGHT: 63 IN | DIASTOLIC BLOOD PRESSURE: 58 MMHG | WEIGHT: 154 LBS

## 2025-06-25 DIAGNOSIS — S09.90XA CLOSED HEAD INJURY, INITIAL ENCOUNTER: Primary | ICD-10-CM

## 2025-06-25 DIAGNOSIS — S49.92XA INJURY OF LEFT SHOULDER, INITIAL ENCOUNTER: ICD-10-CM

## 2025-06-25 DIAGNOSIS — S00.03XA SCALP HEMATOMA, INITIAL ENCOUNTER: ICD-10-CM

## 2025-06-25 DIAGNOSIS — S60.512A ABRASION OF LEFT HAND, INITIAL ENCOUNTER: ICD-10-CM

## 2025-06-25 PROCEDURE — 6370000000 HC RX 637 (ALT 250 FOR IP): Performed by: NURSE PRACTITIONER

## 2025-06-25 PROCEDURE — 73030 X-RAY EXAM OF SHOULDER: CPT

## 2025-06-25 PROCEDURE — 90714 TD VACC NO PRESV 7 YRS+ IM: CPT | Performed by: NURSE PRACTITIONER

## 2025-06-25 PROCEDURE — 90471 IMMUNIZATION ADMIN: CPT | Performed by: NURSE PRACTITIONER

## 2025-06-25 PROCEDURE — 99284 EMERGENCY DEPT VISIT MOD MDM: CPT

## 2025-06-25 PROCEDURE — 70450 CT HEAD/BRAIN W/O DYE: CPT

## 2025-06-25 PROCEDURE — 70486 CT MAXILLOFACIAL W/O DYE: CPT

## 2025-06-25 PROCEDURE — 72125 CT NECK SPINE W/O DYE: CPT

## 2025-06-25 PROCEDURE — 6360000002 HC RX W HCPCS: Performed by: NURSE PRACTITIONER

## 2025-06-25 RX ORDER — TRAMADOL HYDROCHLORIDE 50 MG/1
50 TABLET ORAL ONCE
Status: COMPLETED | OUTPATIENT
Start: 2025-06-25 | End: 2025-06-25

## 2025-06-25 RX ADMIN — CLOSTRIDIUM TETANI TOXOID ANTIGEN (FORMALDEHYDE INACTIVATED) AND CORYNEBACTERIUM DIPHTHERIAE TOXOID ANTIGEN (FORMALDEHYDE INACTIVATED) 0.5 ML: 5; 2 INJECTION, SUSPENSION INTRAMUSCULAR at 16:35

## 2025-06-25 RX ADMIN — TRAMADOL HYDROCHLORIDE 50 MG: 50 TABLET, COATED ORAL at 16:34

## 2025-06-25 ASSESSMENT — PAIN DESCRIPTION - LOCATION
LOCATION: SHOULDER
LOCATION: HEAD;NECK;SHOULDER

## 2025-06-25 ASSESSMENT — PAIN - FUNCTIONAL ASSESSMENT
PAIN_FUNCTIONAL_ASSESSMENT: ACTIVITIES ARE NOT PREVENTED
PAIN_FUNCTIONAL_ASSESSMENT: 0-10

## 2025-06-25 ASSESSMENT — PAIN DESCRIPTION - ORIENTATION
ORIENTATION: LEFT
ORIENTATION: LEFT

## 2025-06-25 ASSESSMENT — LIFESTYLE VARIABLES
HOW OFTEN DO YOU HAVE A DRINK CONTAINING ALCOHOL: NEVER
HOW MANY STANDARD DRINKS CONTAINING ALCOHOL DO YOU HAVE ON A TYPICAL DAY: PATIENT DOES NOT DRINK

## 2025-06-25 ASSESSMENT — PAIN SCALES - GENERAL
PAINLEVEL_OUTOF10: 7
PAINLEVEL_OUTOF10: 7

## 2025-06-25 ASSESSMENT — PAIN DESCRIPTION - PAIN TYPE: TYPE: ACUTE PAIN

## 2025-06-25 NOTE — DISCHARGE INSTRUCTIONS
XR SHOULDER LEFT (MIN 2 VIEWS)   Final Result   No acute abnormality.      AC joint degenerative changes.         CT HEAD WO CONTRAST   Final Result   No acute intracranial abnormality.      Left frontal scalp/periorbital hematoma.         CT CERVICAL SPINE WO CONTRAST   Final Result   No acute abnormality of the cervical spine.         CT FACIAL BONES WO CONTRAST   Final Result   No acute facial bone trauma.      Left frontal scalp/periorbital hematoma.  RECOMMENDATIONS:         Use Tylenol every 4 hours, use ice to the forehead and the shoulder

## 2025-06-25 NOTE — DISCHARGE INSTR - COC
Continuity of Care Form    Patient Name: Lamberto Dotson   :  1935  MRN:  [id number]    Admit date:  2025  Discharge date:  ***    Code Status Order: Prior   Advance Directives:     Admitting Physician:  No admitting provider for patient encounter.  PCP: Surjit Milian MD    Discharging Nurse: ***  Discharging Hospital Unit/Room#: HALL07/RAY-07  Discharging Unit Phone Number: ***    Emergency Contact:   Extended Emergency Contact Information  Primary Emergency Contact: Rupa Dotson  Address: [address] [place] [address] [facility]  Home Phone: [phone]  Relation: Spouse  Secondary Emergency Contact: Georgette Dotson   [facility]  Home Phone: [phone]  Relation: Child    Past Surgical History:  Past Surgical History:   Procedure Laterality Date    EAR SURGERY Left 2024    excision of basal cell carcinoma of left post auricular dermis with rhomboid flap performed by Jhoan Craft MD at [facility]    EYE SURGERY Bilateral     cataract removal w lense implants    KNEE SURGERY Right        Immunization History:   Immunization History   Administered Date(s) Administered    COVID-19, PFIZER PURPLE top, DILUTE for use, (age 12 y+), 30mcg/0.3mL 2021, 2021    TD 5LF, TENIVAC, (age 7y+), IM, 0.5mL 2025       Active Problems:  Patient Active Problem List   Diagnosis Code    Persistent atrial fibrillation (HCC) I48.19    LBBB (left bundle branch block) I44.7    Ex-smoker Z87.891    Sleep-disordered breathing G47.30    Essential hypertension I10    Coronary artery disease involving native coronary artery of native heart without angina pectoris I25.10    BCC (basal cell carcinoma) C44.91    Post-op pain G89.18       Isolation/Infection:   Isolation            No Isolation          Patient Infection Status    None to display         Nurse Assessment:  Last Vital Signs: BP (!) 109/58   Pulse 75   Temp 98.1 °F (36.7 °C) (Oral)   Resp 18

## 2025-06-25 NOTE — ED PROVIDER NOTES
Independent FLY Visit.  HPI:  6/25/25,   Time: 1:53 PM EDT         Lamberto Dotson is a 89 y.o. male presenting to the ED for a fall that happened yesterday.  Patient states he was gardening and he went to push a cart out of the way when he bent over the cart started moving causing him to fall and strike the left side of his head and face onto the ground he also scraped his hands on the wheel of the cart.  Patient states he did not lose consciousness.  He does not have dizziness or blurry vision.  He has no nausea vomiting chest pain or shortness of breath.  He has no headache.  He does have left shoulder pain some mild pain over the abrasion of the left hand and he has significant bruising around the orbit and the left frontal scalp.  Patient is on a blood thinner that is why he arrived today to the emergency room.  Patient's wife accompanies the patient and states he has been acting normal, no excessive sleepiness, no nausea, no confusion or amnesia.  Patient also was having some mild left-sided neck pain patient is on Xarelto for atrial fibrillation  ROS:   Pertinent positives and negatives are stated within HPI, all other systems reviewed and are negative.  --------------------------------------------- PAST HISTORY ---------------------------------------------  Past Medical History:  has a past medical history of A-fib (HCC), Arthritis, CAD (coronary artery disease), HTN (hypertension), Hyperlipemia, LBBB (left bundle branch block), and MI (myocardial infarction) ([facility]).    Past Surgical History:  has a past surgical history that includes eye surgery (Bilateral); knee surgery (Right); and Ear surgery (Left, 8/29/2024).    Social History:  reports that he quit smoking about 28 years ago. His smoking use included cigarettes. He started smoking about 48 years ago. He has a 6 pack-year smoking history. He has never used smokeless tobacco. He reports current alcohol use of about 1.0 standard drink of alcohol per

## 2025-07-01 ENCOUNTER — TELEPHONE (OUTPATIENT)
Dept: ORTHOPEDIC SURGERY | Age: 89
End: 2025-07-01

## 2025-07-03 ENCOUNTER — TELEPHONE (OUTPATIENT)
Dept: ORTHOPEDIC SURGERY | Age: 89
End: 2025-07-03

## 2025-07-03 NOTE — TELEPHONE ENCOUNTER
----- Message from Dr. Agustin Coats MD sent at 6/26/2025  3:15 PM EDT -----  Non urgent next new  ----- Message -----  From: Discharge Provider, Automatic  Sent: 6/26/2025   6:05 AM EDT  To: Agustin Coats MD

## 2025-08-21 ENCOUNTER — OFFICE VISIT (OUTPATIENT)
Dept: CARDIOLOGY CLINIC | Age: 89
End: 2025-08-21
Payer: MEDICARE

## 2025-08-21 VITALS
OXYGEN SATURATION: 97 % | RESPIRATION RATE: 18 BRPM | BODY MASS INDEX: 28.34 KG/M2 | DIASTOLIC BLOOD PRESSURE: 60 MMHG | TEMPERATURE: 97.7 F | HEART RATE: 66 BPM | HEIGHT: 62 IN | SYSTOLIC BLOOD PRESSURE: 122 MMHG | WEIGHT: 154 LBS

## 2025-08-21 DIAGNOSIS — I25.10 CORONARY ARTERY DISEASE INVOLVING NATIVE CORONARY ARTERY OF NATIVE HEART WITHOUT ANGINA PECTORIS: ICD-10-CM

## 2025-08-21 DIAGNOSIS — I48.19 PERSISTENT ATRIAL FIBRILLATION (HCC): Primary | ICD-10-CM

## 2025-08-21 PROCEDURE — 1159F MED LIST DOCD IN RCRD: CPT | Performed by: INTERNAL MEDICINE

## 2025-08-21 PROCEDURE — 1123F ACP DISCUSS/DSCN MKR DOCD: CPT | Performed by: INTERNAL MEDICINE

## 2025-08-21 PROCEDURE — G2211 COMPLEX E/M VISIT ADD ON: HCPCS | Performed by: INTERNAL MEDICINE

## 2025-08-21 PROCEDURE — 99214 OFFICE O/P EST MOD 30 MIN: CPT | Performed by: INTERNAL MEDICINE

## 2025-08-21 PROCEDURE — 93000 ELECTROCARDIOGRAM COMPLETE: CPT | Performed by: INTERNAL MEDICINE

## 2025-08-21 RX ORDER — METFORMIN HYDROCHLORIDE 500 MG/1
500 TABLET, EXTENDED RELEASE ORAL
COMMUNITY
Start: 2025-08-10

## (undated) DEVICE — MICRODISSECTION NEEDLE STRAIGHT SLEEVE: Brand: COLORADO

## (undated) DEVICE — SYRINGE MED 20ML STD CLR PLAS LUERLOCK TIP N CTRL DISP

## (undated) DEVICE — HEAD AND NECK: Brand: MEDLINE INDUSTRIES, INC.

## (undated) DEVICE — SOLUTION IRRIG 1000ML STRL H2O USP PLAS POUR BTL

## (undated) DEVICE — NEEDLE HYPO 27GA L15IN REG BVL W O SFTY FOR SYR DISPOSABLE

## (undated) DEVICE — DRESSING,GAUZE,XEROFORM,CURAD,5"X9",ST: Brand: CURAD

## (undated) DEVICE — ELECTRODE PT RET AD L9FT HI MOIST COND ADH HYDRGEL CORDED

## (undated) DEVICE — SYRINGE MED 10ML TRNSLUC BRL PLUNG BLK MRK POLYPR CTRL

## (undated) DEVICE — MARKER,SKIN,WI/RULER AND LABELS: Brand: MEDLINE

## (undated) DEVICE — UNIVERSAL DRAPE: Brand: MEDLINE INDUSTRIES, INC.

## (undated) DEVICE — BLADE,STAINLESS-STEEL,15,STRL,DISPOSABLE: Brand: MEDLINE

## (undated) DEVICE — Device

## (undated) DEVICE — STERILE POLYISOPRENE POWDER-FREE SURGICAL GLOVES: Brand: PROTEXIS

## (undated) DEVICE — BLADE,STAINLESS-STEEL,10,STRL,DISPOSABLE: Brand: MEDLINE

## (undated) DEVICE — TRAY,SKIN SCRUB,DRY,W/GAUZE: Brand: MEDLINE

## (undated) DEVICE — COTTON STRIP: Brand: DEROYAL

## (undated) DEVICE — STRAP POS MP 30X3 IN HK LOOP CLOSURE FOAM DISP